# Patient Record
Sex: FEMALE | Race: WHITE | NOT HISPANIC OR LATINO | Employment: UNEMPLOYED | URBAN - METROPOLITAN AREA
[De-identification: names, ages, dates, MRNs, and addresses within clinical notes are randomized per-mention and may not be internally consistent; named-entity substitution may affect disease eponyms.]

---

## 2017-02-21 ENCOUNTER — HISTORICAL (OUTPATIENT)
Dept: LAB | Facility: HOSPITAL | Age: 37
End: 2017-02-21

## 2017-02-21 LAB
ABS NEUT (OLG): 4.51 X10(3)/MCL (ref 2.1–9.2)
ALBUMIN SERPL-MCNC: 3.7 GM/DL (ref 3.4–5)
ALBUMIN/GLOB SERPL: 1 RATIO (ref 1.1–2)
ALP SERPL-CCNC: 58 UNIT/L (ref 38–126)
ALT SERPL-CCNC: 27 UNIT/L (ref 12–78)
AST SERPL-CCNC: 16 UNIT/L (ref 15–37)
BASOPHILS # BLD AUTO: 0 X10(3)/MCL (ref 0–0.2)
BASOPHILS NFR BLD AUTO: 0 %
BILIRUB SERPL-MCNC: 0.4 MG/DL (ref 0.2–1)
BILIRUBIN DIRECT+TOT PNL SERPL-MCNC: 0.1 MG/DL (ref 0–0.5)
BILIRUBIN DIRECT+TOT PNL SERPL-MCNC: 0.3 MG/DL (ref 0–0.8)
BUN SERPL-MCNC: 12 MG/DL (ref 7–18)
CALCIUM SERPL-MCNC: 8.3 MG/DL (ref 8.5–10.1)
CHLORIDE SERPL-SCNC: 107 MMOL/L (ref 98–107)
CHOLEST SERPL-MCNC: 160 MG/DL (ref 0–200)
CHOLEST/HDLC SERPL: 2.4 {RATIO} (ref 0–4)
CO2 SERPL-SCNC: 30 MMOL/L (ref 21–32)
CREAT SERPL-MCNC: 0.82 MG/DL (ref 0.55–1.02)
EOSINOPHIL # BLD AUTO: 0 X10(3)/MCL (ref 0–0.9)
EOSINOPHIL NFR BLD AUTO: 1 %
ERYTHROCYTE [DISTWIDTH] IN BLOOD BY AUTOMATED COUNT: 12.9 % (ref 11.5–17)
GLOBULIN SER-MCNC: 3.6 GM/DL (ref 2.4–3.5)
GLUCOSE SERPL-MCNC: 79 MG/DL (ref 74–106)
HCT VFR BLD AUTO: 37.6 % (ref 37–47)
HDLC SERPL-MCNC: 66 MG/DL (ref 35–60)
HGB BLD-MCNC: 12.1 GM/DL (ref 12–16)
LDLC SERPL CALC-MCNC: 83 MG/DL (ref 0–129)
LYMPHOCYTES # BLD AUTO: 1.9 X10(3)/MCL (ref 0.6–4.6)
LYMPHOCYTES NFR BLD AUTO: 27 %
MCH RBC QN AUTO: 28.8 PG (ref 27–31)
MCHC RBC AUTO-ENTMCNC: 32.2 GM/DL (ref 33–36)
MCV RBC AUTO: 89.5 FL (ref 80–94)
MONOCYTES # BLD AUTO: 0.5 X10(3)/MCL (ref 0.1–1.3)
MONOCYTES NFR BLD AUTO: 7 %
NEUTROPHILS # BLD AUTO: 4.51 X10(3)/MCL (ref 1.4–7.9)
NEUTROPHILS NFR BLD AUTO: 65 %
PLATELET # BLD AUTO: 177 X10(3)/MCL (ref 130–400)
PMV BLD AUTO: 10.6 FL (ref 9.4–12.4)
POTASSIUM SERPL-SCNC: 3.6 MMOL/L (ref 3.5–5.1)
PROT SERPL-MCNC: 7.3 GM/DL (ref 6.4–8.2)
RBC # BLD AUTO: 4.2 X10(6)/MCL (ref 4.2–5.4)
SODIUM SERPL-SCNC: 144 MMOL/L (ref 136–145)
TRIGL SERPL-MCNC: 56 MG/DL (ref 30–150)
TSH SERPL-ACNC: 1.06 MIU/ML (ref 0.36–3.74)
VLDLC SERPL CALC-MCNC: 11 MG/DL
WBC # SPEC AUTO: 6.9 X10(3)/MCL (ref 4.5–11.5)

## 2018-06-18 ENCOUNTER — HISTORICAL (OUTPATIENT)
Dept: LAB | Facility: HOSPITAL | Age: 38
End: 2018-06-18

## 2018-06-18 LAB
ABS NEUT (OLG): 3.55 X10(3)/MCL (ref 2.1–9.2)
ALBUMIN SERPL-MCNC: 3.6 GM/DL (ref 3.4–5)
ALBUMIN/GLOB SERPL: 1 {RATIO}
ALP SERPL-CCNC: 71 UNIT/L (ref 45–117)
ALT SERPL-CCNC: 16 UNIT/L (ref 13–56)
AST SERPL-CCNC: 13 UNIT/L (ref 15–37)
BASOPHILS # BLD AUTO: 0.03 X10(3)/MCL (ref 0–0.2)
BASOPHILS NFR BLD AUTO: 0.5 % (ref 0–1)
BILIRUB SERPL-MCNC: 0.3 MG/DL (ref 0.2–1)
BILIRUBIN DIRECT+TOT PNL SERPL-MCNC: <0.1 MG/DL (ref 0–0.2)
BILIRUBIN DIRECT+TOT PNL SERPL-MCNC: >0.2 MG/DL (ref 0–1)
BUN SERPL-MCNC: 7 MG/DL (ref 7–18)
CALCIUM SERPL-MCNC: 8.2 MG/DL (ref 8.5–10.1)
CHLORIDE SERPL-SCNC: 108 MMOL/L (ref 98–107)
CHOLEST SERPL-MCNC: 147 MG/DL (ref 0–200)
CHOLEST/HDLC SERPL: 2.5 {RATIO} (ref 0–4)
CO2 SERPL-SCNC: 26 MMOL/L (ref 21–32)
CREAT SERPL-MCNC: 0.71 MG/DL (ref 0.55–1.02)
DEPRECATED CALCIDIOL+CALCIFEROL SERPL-MC: 22 NG/ML (ref 30–80)
EOSINOPHIL # BLD AUTO: 0.08 X10(3)/MCL (ref 0–0.9)
EOSINOPHIL NFR BLD AUTO: 1.3 % (ref 0–6.4)
ERYTHROCYTE [DISTWIDTH] IN BLOOD BY AUTOMATED COUNT: 13.2 % (ref 11.5–17)
GLOBULIN SER-MCNC: 4 GM/DL (ref 2–4)
GLUCOSE SERPL-MCNC: 79 MG/DL (ref 74–106)
HCT VFR BLD AUTO: 33.9 % (ref 37–47)
HDLC SERPL-MCNC: 58 MG/DL (ref 35–60)
HGB BLD-MCNC: 11.2 GM/DL (ref 12–16)
IMM GRANULOCYTES # BLD AUTO: 0.01 10*3/UL (ref 0–0.02)
IMM GRANULOCYTES NFR BLD AUTO: 0.2 % (ref 0–0.43)
LDLC SERPL CALC-MCNC: 77 MG/DL (ref 0–129)
LYMPHOCYTES # BLD AUTO: 1.87 X10(3)/MCL (ref 0.6–4.6)
LYMPHOCYTES NFR BLD AUTO: 31.5 % (ref 16–44)
MAGNESIUM SERPL-MCNC: 2.1 MG/DL (ref 1.8–2.4)
MCH RBC QN AUTO: 27.9 PG (ref 27–31)
MCHC RBC AUTO-ENTMCNC: 33 GM/DL (ref 33–36)
MCV RBC AUTO: 84.3 FL (ref 80–94)
MONOCYTES # BLD AUTO: 0.4 X10(3)/MCL (ref 0.1–1.3)
MONOCYTES NFR BLD AUTO: 6.7 % (ref 4–12.1)
NEUTROPHILS # BLD AUTO: 3.55 X10(3)/MCL (ref 2.1–9.2)
NEUTROPHILS NFR BLD AUTO: 59.8 % (ref 43–73)
NRBC BLD AUTO-RTO: 0 % (ref 0–0.2)
PLATELET # BLD AUTO: 152 X10(3)/MCL (ref 130–400)
PMV BLD AUTO: 10.3 FL (ref 7.4–10.4)
POTASSIUM SERPL-SCNC: 3.6 MMOL/L (ref 3.5–5.1)
PROT SERPL-MCNC: 7.6 GM/DL (ref 6.4–8.2)
RBC # BLD AUTO: 4.02 X10(6)/MCL (ref 4.2–5.4)
SODIUM SERPL-SCNC: 141 MMOL/L (ref 136–145)
TRIGL SERPL-MCNC: 59 MG/DL (ref 30–150)
TSH SERPL-ACNC: 1.65 MIU/ML (ref 0.36–3.74)
VIT B12 SERPL-MCNC: 910 PG/ML (ref 193–986)
VLDLC SERPL CALC-MCNC: 12 MG/DL
WBC # SPEC AUTO: 5.9 X10(3)/MCL (ref 4.5–11.5)

## 2019-07-18 ENCOUNTER — NURSE TRIAGE (OUTPATIENT)
Dept: ADMINISTRATIVE | Facility: CLINIC | Age: 39
End: 2019-07-18

## 2019-07-19 NOTE — TELEPHONE ENCOUNTER
Pt wanted to know if she could get something called in so she could sleep, advised her that she would have to see a provider to have a controlled substance prescribed to her, caller agrees

## 2019-10-20 ENCOUNTER — NURSE TRIAGE (OUTPATIENT)
Dept: ADMINISTRATIVE | Facility: CLINIC | Age: 39
End: 2019-10-20

## 2019-10-20 NOTE — TELEPHONE ENCOUNTER
Reason for Disposition   Dizziness, lightheadedness, or weakness    Additional Information   Negative: Passed out (i.e., lost consciousness, collapsed and was not responding)   Negative: Shock suspected (e.g., cold/pale/clammy skin, too weak to stand, low BP, rapid pulse)   Negative: Difficult to awaken or acting confused (e.g., disoriented, slurred speech)   Negative: Visible sweat on face or sweat dripping down face   Negative: Unable to walk, or can only walk with assistance (e.g., requires support)   Negative: [1] Received SHOCK from implantable cardiac defibrillator AND [2] persisting symptoms (i.e., palpitations, lightheadedness)   Negative: Sounds like a life-threatening emergency to the triager   Negative: Chest pain   Negative: Difficulty breathing    Protocols used: HEART RATE AND HEARTBEAT [id number]  pt called stating she is a dr [facility] pt. started having heart palps. Felt faint. Sx on and off for past week noticed most when trying to go to bed. hx MVP. Pt concerns about thyroid. Pt admits to being lightheaded. rec ED. Pt agrees. Office message sent to PCP at pt request. Call back with questions

## 2019-11-20 ENCOUNTER — HISTORICAL (OUTPATIENT)
Dept: LAB | Facility: HOSPITAL | Age: 39
End: 2019-11-20

## 2019-11-20 LAB
ABS NEUT (OLG): 3.24 X10(3)/MCL (ref 2.1–9.2)
ALBUMIN SERPL-MCNC: 3.7 GM/DL (ref 3.4–5)
ALBUMIN/GLOB SERPL: 1.1 {RATIO}
ALP SERPL-CCNC: 64 UNIT/L (ref 38–126)
ALT SERPL-CCNC: 19 UNIT/L (ref 12–78)
AST SERPL-CCNC: 13 UNIT/L (ref 15–37)
BASOPHILS # BLD AUTO: 0 X10(3)/MCL (ref 0–0.2)
BASOPHILS NFR BLD AUTO: 1 %
BILIRUB SERPL-MCNC: 0.6 MG/DL (ref 0.2–1)
BILIRUBIN DIRECT+TOT PNL SERPL-MCNC: 0.1 MG/DL (ref 0–0.2)
BILIRUBIN DIRECT+TOT PNL SERPL-MCNC: 0.5 MG/DL (ref 0–0.8)
BUN SERPL-MCNC: 11 MG/DL (ref 7–18)
CALCIUM SERPL-MCNC: 8.5 MG/DL (ref 8.5–10.1)
CHLORIDE SERPL-SCNC: 105 MMOL/L (ref 98–107)
CHOLEST SERPL-MCNC: 160 MG/DL (ref 0–200)
CHOLEST/HDLC SERPL: 3.2 {RATIO} (ref 0–4)
CO2 SERPL-SCNC: 30 MMOL/L (ref 21–32)
CREAT SERPL-MCNC: 0.72 MG/DL (ref 0.55–1.02)
DEPRECATED CALCIDIOL+CALCIFEROL SERPL-MC: 24.22 NG/ML (ref 30–80)
EOSINOPHIL # BLD AUTO: 0 X10(3)/MCL (ref 0–0.9)
EOSINOPHIL NFR BLD AUTO: 1 %
ERYTHROCYTE [DISTWIDTH] IN BLOOD BY AUTOMATED COUNT: 14 % (ref 11.5–17)
GLOBULIN SER-MCNC: 3.3 GM/DL (ref 2.4–3.5)
GLUCOSE SERPL-MCNC: 85 MG/DL (ref 74–106)
HCT VFR BLD AUTO: 36.6 % (ref 37–47)
HDLC SERPL-MCNC: 50 MG/DL (ref 35–60)
HGB BLD-MCNC: 11.3 GM/DL (ref 12–16)
LDLC SERPL CALC-MCNC: 92 MG/DL (ref 0–129)
LYMPHOCYTES # BLD AUTO: 1.7 X10(3)/MCL (ref 0.6–4.6)
LYMPHOCYTES NFR BLD AUTO: 31 %
MCH RBC QN AUTO: 26.9 PG (ref 27–31)
MCHC RBC AUTO-ENTMCNC: 30.9 GM/DL (ref 33–36)
MCV RBC AUTO: 87.1 FL (ref 80–94)
MONOCYTES # BLD AUTO: 0.4 X10(3)/MCL (ref 0.1–1.3)
MONOCYTES NFR BLD AUTO: 7 %
NEUTROPHILS # BLD AUTO: 3.24 X10(3)/MCL (ref 2.1–9.2)
NEUTROPHILS NFR BLD AUTO: 60 %
PLATELET # BLD AUTO: 173 X10(3)/MCL (ref 130–400)
PMV BLD AUTO: 10.8 FL (ref 9.4–12.4)
POTASSIUM SERPL-SCNC: 3.7 MMOL/L (ref 3.5–5.1)
PROT SERPL-MCNC: 7 GM/DL (ref 6.4–8.2)
RBC # BLD AUTO: 4.2 X10(6)/MCL (ref 4.2–5.4)
SODIUM SERPL-SCNC: 140 MMOL/L (ref 136–145)
T3FREE SERPL-MCNC: 2.63 PG/ML (ref 2.18–3.98)
T4 FREE SERPL-MCNC: 0.89 NG/DL (ref 0.76–1.46)
TRIGL SERPL-MCNC: 90 MG/DL (ref 30–150)
TSH SERPL-ACNC: 1.73 MIU/L (ref 0.36–3.74)
VIT B12 SERPL-MCNC: 930 PG/ML (ref 193–986)
VLDLC SERPL CALC-MCNC: 18 MG/DL
WBC # SPEC AUTO: 5.4 X10(3)/MCL (ref 4.5–11.5)

## 2020-01-17 ENCOUNTER — HISTORICAL (OUTPATIENT)
Dept: RADIOLOGY | Facility: HOSPITAL | Age: 40
End: 2020-01-17

## 2020-02-03 ENCOUNTER — HISTORICAL (OUTPATIENT)
Dept: ANESTHESIOLOGY | Facility: HOSPITAL | Age: 40
End: 2020-02-03

## 2020-02-18 ENCOUNTER — NURSE TRIAGE (OUTPATIENT)
Dept: ADMINISTRATIVE | Facility: CLINIC | Age: 40
End: 2020-02-18

## 2020-02-18 NOTE — TELEPHONE ENCOUNTER
"Nausea and vomiting at 1 am. Used phenergan suppository.    Reason for Disposition   [1] SEVERE vomiting (e.g., 6 or more times/day, vomits everything) BUT [2] hydrated    Additional Information   Negative: Shock suspected (e.g., cold/pale/clammy skin, too weak to stand, low BP, rapid pulse)   Negative: Difficult to awaken or acting confused (e.g., disoriented, slurred speech)   Negative: Sounds like a life-threatening emergency to the triager   Negative: Vomiting occurs only while coughing   Negative: [1] Pregnant < 20 Weeks AND [2] nausea/vomiting began in early pregnancy (i.e., 4-8 weeks pregnant)   Negative: Chest pain   Negative: Headache is main symptom   Negative: Vomiting (or Nausea) in a cancer patient who is currently (or recently) receiving chemotherapy or radiation therapy, or cancer patient who has metastatic or end-stage cancer and is receiving palliative care   Negative: [1] Vomiting AND [2] contains red blood or black ("coffee ground") material  (Exception: few red streaks in vomit that only happened once)   Negative: Severe pain in one eye   Negative: Recent head injury (within last 3 days)   Negative: Recent abdominal injury (within last 3 days)   Negative: [1] Insulin-dependent diabetes (Type I) AND [2] glucose > 400 mg/dl (22 mmol/l)   Negative: [1] SEVERE vomiting (e.g., 6 or more times/day) AND [2] present > 8 hours   Negative: [1] MODERATE vomiting (e.g., 3 - 5 times/day) AND [2] age > 60   Negative: Severe headache (e.g., excruciating) (Exception: similar to previous migraines)   Negative: High-risk adult (e.g., diabetes mellitus, brain tumor, V-P shunt, hernia)   Negative: [1] Drinking very little AND [2] dehydration suspected (e.g., no urine > 12 hours, very dry mouth, very lightheaded)   Negative: Patient sounds very sick or weak to the triager   Negative: [1] Vomiting AND [2] abdomen looks much more swollen than usual   Negative: [1] Vomiting AND [2] contains bile " (green color)   Negative: [1] Constant abdominal pain AND [2] present > 2 hours   Negative: [1] Fever > 103 F (39.4 C) AND [2] not able to get the fever down using Fever Care Advice   Negative: [1] Fever > 101 F (38.3 C) AND [2] age > 60   Negative: [1] Fever > 100.0 F (37.8 C) AND [2] bedridden (e.g., nursing home patient, CVA, chronic illness, recovering from surgery)   Negative: [1] Fever > 100.0 F (37.8 C) AND [2] weak immune system (e.g., HIV positive, cancer chemo, splenectomy, organ transplant, chronic steroids)   Negative: Taking any of the following medications: digoxin (Lanoxin), lithium, theophylline, phenytoin (Dilantin)   Negative: [1] MILD or MODERATE vomiting AND [2] present > 48 hours (2 days) (Exception: mild vomiting with associated diarrhea)   Negative: Fever present > 3 days (72 hours)   Negative: Vomiting a prescription medication   Negative: [1] MILD vomiting with diarrhea AND [2] present > 5 days   Negative: Alcohol or drug abuse, known or suspected   Negative: Vomiting is a chronic symptom (recurrent or ongoing AND present > 4 weeks)    Protocols used: VOMITING-A-

## 2020-02-19 ENCOUNTER — TELEPHONE (OUTPATIENT)
Dept: GASTROENTEROLOGY | Facility: CLINIC | Age: 40
End: 2020-02-19

## 2020-02-19 LAB
INFLUENZA A ANTIGEN, POC: NEGATIVE
INFLUENZA B ANTIGEN, POC: NEGATIVE

## 2020-02-19 NOTE — TELEPHONE ENCOUNTER
----- Message from Kathryn Frankel sent at 2/19/2020  9:12 AM CST -----  Regarding: External referral  Good morning!    Patient being referred to Dr. Mesa for diaphragmatic hernia wo obstruction or gangrene//esophageal dysmotility//GERD w esophagitis//constipation, slow transit. I spk with insurance company ([employer]) as I was unable to verify plan in our system. I was told they are not part of a network so patient would be considered self pay and then insurance would reimburse. I wasn't sure how you wanted to proceed. Referral and records (2 files) from Dr. Whitlock are scanned into .    Thank you,  Kathryn

## 2020-02-20 ENCOUNTER — TELEPHONE (OUTPATIENT)
Dept: GASTROENTEROLOGY | Facility: CLINIC | Age: 40
End: 2020-02-20

## 2020-02-20 NOTE — TELEPHONE ENCOUNTER
Spoke with patient about her referral and to let her know that if her insurance does not cover she will be paying out of pocket

## 2020-02-20 NOTE — TELEPHONE ENCOUNTER
----- Message from Noemi Pennington sent at 2/20/2020 12:18 PM CST -----  Contact: [phone]  Calling in regards to missed appointment from [facility]. Please call

## 2020-02-20 NOTE — TELEPHONE ENCOUNTER
Chrissy,     Can you help confirm that we do not take this pts insurance.      If this is the case, we should let pt know that her condition will require multiple tests (at least EGD, which costs thousands of dollars) and she would have to pay cash for all her visits and testing. We are unable to give her an estimate of the cost.        Sincerely,   Dr. Mesa

## 2020-03-02 ENCOUNTER — TELEPHONE (OUTPATIENT)
Dept: GASTROENTEROLOGY | Facility: CLINIC | Age: 40
End: 2020-03-02

## 2020-03-02 NOTE — TELEPHONE ENCOUNTER
Spoke with patient and let her know we do not take her insurance and she would have to pay out of pocket

## 2020-04-15 ENCOUNTER — NURSE TRIAGE (OUTPATIENT)
Dept: ADMINISTRATIVE | Facility: CLINIC | Age: 40
End: 2020-04-15

## 2020-04-15 NOTE — TELEPHONE ENCOUNTER
Pt states she was calling to get a message to her pcp to go to physical therapy at [facility].  Starr Funk MD,  Pt advise per protocol and verbalized understanding.    Reason for Disposition   [1] Caller requesting NON-URGENT health information AND [2] PCP's office is the best resource    Additional Information   Negative: RN needs further essential information from caller in order to complete triage   Negative: Requesting regular office appointment    Protocols used: INFORMATION ONLY CALL-A-    Care Advice Given     Given By Given At Modified    Caryn Barriga RN 4/15/2020  6:45 PM No    CALL PCP WHEN OFFICE IS OPEN: You need to discuss this with your doctor within the next few days. Call him/her during regular office hours.    Caryn Barriga RN 4/15/2020  6:45 PM No    CALL BACK IF:    * You have more questions.      Disposition     Call PCP When Office is Open         What would patient/caregiver have done without intervention? Would have attempted to contact the Provider    Patient/Caregiver understands and will follow disposition? Yes, will follow disposition      Protocols (Most recently selected listed first)     Name Status    INFORMATION ONLY CALL-A-AH Used    PCP CALL - NO TRIAGE-A- Viewed

## 2020-09-02 LAB
PAP RECOMMENDATION EXT: NORMAL
PAP SMEAR: NORMAL

## 2021-01-24 ENCOUNTER — NURSE TRIAGE (OUTPATIENT)
Dept: ADMINISTRATIVE | Facility: CLINIC | Age: 41
End: 2021-01-24

## 2021-01-27 ENCOUNTER — HISTORICAL (OUTPATIENT)
Dept: LAB | Facility: HOSPITAL | Age: 41
End: 2021-01-27

## 2022-01-10 ENCOUNTER — HISTORICAL (OUTPATIENT)
Dept: RADIOLOGY | Facility: HOSPITAL | Age: 42
End: 2022-01-10

## 2022-04-11 ENCOUNTER — HISTORICAL (OUTPATIENT)
Dept: ADMINISTRATIVE | Facility: HOSPITAL | Age: 42
End: 2022-04-11
Payer: COMMERCIAL

## 2022-04-24 VITALS
WEIGHT: 156.5 LBS | SYSTOLIC BLOOD PRESSURE: 95 MMHG | HEIGHT: 64 IN | DIASTOLIC BLOOD PRESSURE: 65 MMHG | BODY MASS INDEX: 26.72 KG/M2 | OXYGEN SATURATION: 99 %

## 2022-09-07 ENCOUNTER — OFFICE VISIT (OUTPATIENT)
Dept: FAMILY MEDICINE | Facility: CLINIC | Age: 42
End: 2022-09-07
Payer: COMMERCIAL

## 2022-09-07 VITALS
OXYGEN SATURATION: 98 % | RESPIRATION RATE: 18 BRPM | TEMPERATURE: 99 F | DIASTOLIC BLOOD PRESSURE: 70 MMHG | HEART RATE: 72 BPM | BODY MASS INDEX: 26.92 KG/M2 | SYSTOLIC BLOOD PRESSURE: 104 MMHG | WEIGHT: 157.69 LBS | HEIGHT: 64 IN

## 2022-09-07 DIAGNOSIS — Z71.84 TRAVEL ADVICE ENCOUNTER: Primary | ICD-10-CM

## 2022-09-07 DIAGNOSIS — M79.7 FIBROMYALGIA: ICD-10-CM

## 2022-09-07 DIAGNOSIS — K21.9 GASTROESOPHAGEAL REFLUX DISEASE, UNSPECIFIED WHETHER ESOPHAGITIS PRESENT: ICD-10-CM

## 2022-09-07 DIAGNOSIS — F42.9 OBSESSIVE-COMPULSIVE DISORDER, UNSPECIFIED TYPE: ICD-10-CM

## 2022-09-07 DIAGNOSIS — E55.9 HYPOVITAMINOSIS D: ICD-10-CM

## 2022-09-07 PROCEDURE — 99213 PR OFFICE/OUTPT VISIT, EST, LEVL III, 20-29 MIN: ICD-10-PCS | Mod: ,,, | Performed by: FAMILY MEDICINE

## 2022-09-07 PROCEDURE — 1160F RVW MEDS BY RX/DR IN RCRD: CPT | Mod: CPTII,,, | Performed by: FAMILY MEDICINE

## 2022-09-07 PROCEDURE — 99213 OFFICE O/P EST LOW 20 MIN: CPT | Mod: ,,, | Performed by: FAMILY MEDICINE

## 2022-09-07 PROCEDURE — 1160F PR REVIEW ALL MEDS BY PRESCRIBER/CLIN PHARMACIST DOCUMENTED: ICD-10-PCS | Mod: CPTII,,, | Performed by: FAMILY MEDICINE

## 2022-09-07 PROCEDURE — 3078F DIAST BP <80 MM HG: CPT | Mod: CPTII,,, | Performed by: FAMILY MEDICINE

## 2022-09-07 PROCEDURE — 1159F PR MEDICATION LIST DOCUMENTED IN MEDICAL RECORD: ICD-10-PCS | Mod: CPTII,,, | Performed by: FAMILY MEDICINE

## 2022-09-07 PROCEDURE — 1159F MED LIST DOCD IN RCRD: CPT | Mod: CPTII,,, | Performed by: FAMILY MEDICINE

## 2022-09-07 PROCEDURE — 3008F BODY MASS INDEX DOCD: CPT | Mod: CPTII,,, | Performed by: FAMILY MEDICINE

## 2022-09-07 PROCEDURE — 3074F SYST BP LT 130 MM HG: CPT | Mod: CPTII,,, | Performed by: FAMILY MEDICINE

## 2022-09-07 PROCEDURE — 3074F PR MOST RECENT SYSTOLIC BLOOD PRESSURE < 130 MM HG: ICD-10-PCS | Mod: CPTII,,, | Performed by: FAMILY MEDICINE

## 2022-09-07 PROCEDURE — 3008F PR BODY MASS INDEX (BMI) DOCUMENTED: ICD-10-PCS | Mod: CPTII,,, | Performed by: FAMILY MEDICINE

## 2022-09-07 PROCEDURE — 3078F PR MOST RECENT DIASTOLIC BLOOD PRESSURE < 80 MM HG: ICD-10-PCS | Mod: CPTII,,, | Performed by: FAMILY MEDICINE

## 2022-09-07 RX ORDER — AMITRIPTYLINE HYDROCHLORIDE 10 MG/1
10 TABLET, FILM COATED ORAL NIGHTLY
COMMUNITY
Start: 2022-07-12 | End: 2022-09-07 | Stop reason: SDUPTHER

## 2022-09-07 RX ORDER — DEXLANSOPRAZOLE 60 MG/1
60 CAPSULE, DELAYED RELEASE ORAL DAILY
Qty: 10 CAPSULE | Refills: 0 | Status: SHIPPED | OUTPATIENT
Start: 2022-09-07 | End: 2022-11-02

## 2022-09-07 RX ORDER — ZOLPIDEM TARTRATE 5 MG/1
5 TABLET ORAL NIGHTLY PRN
Qty: 10 TABLET | Refills: 0 | Status: SHIPPED | OUTPATIENT
Start: 2022-09-07 | End: 2023-03-13 | Stop reason: SDUPTHER

## 2022-09-07 RX ORDER — FAMOTIDINE 20 MG/1
40 TABLET, FILM COATED ORAL NIGHTLY
COMMUNITY
Start: 2021-10-15 | End: 2022-11-21 | Stop reason: SDUPTHER

## 2022-09-07 RX ORDER — AMITRIPTYLINE HYDROCHLORIDE 10 MG/1
10 TABLET, FILM COATED ORAL NIGHTLY
Qty: 90 TABLET | Refills: 3 | Status: SHIPPED | OUTPATIENT
Start: 2022-09-07 | End: 2023-09-27

## 2022-09-07 RX ORDER — DIAZEPAM 2 MG/1
2 TABLET ORAL EVERY 6 HOURS PRN
Qty: 5 TABLET | Refills: 0 | Status: SHIPPED | OUTPATIENT
Start: 2022-09-07 | End: 2022-11-02

## 2022-09-07 RX ORDER — PROMETHAZINE HYDROCHLORIDE 12.5 MG/1
12.5 TABLET ORAL 4 TIMES DAILY
Qty: 30 TABLET | Refills: 1 | Status: SHIPPED | OUTPATIENT
Start: 2022-09-07 | End: 2023-03-13 | Stop reason: SDUPTHER

## 2022-09-07 RX ORDER — ONDANSETRON 4 MG/1
4 TABLET, ORALLY DISINTEGRATING ORAL 2 TIMES DAILY
Qty: 30 TABLET | Refills: 1 | Status: SHIPPED | OUTPATIENT
Start: 2022-09-07 | End: 2022-09-21 | Stop reason: SDUPTHER

## 2022-09-07 NOTE — ASSESSMENT & PLAN NOTE
Prescriptions sent as requested.  Reminded patient to use sparingly and only prn. Cautioned that ambien, valium and phenergan may all cause drowsiness.

## 2022-09-07 NOTE — PROGRESS NOTES
Subjective:        Patient ID: Latricia Collazo is a 42 y.o. female.    Chief Complaint: med questions & advice      presents to clinic unaccompanied with request for prescriptions.  She is due for her wellness visit in October    Her family is going to [place] for [employer] mission this month.  May be going to [facility] also later in the year and would need anti malarial meds but she will ask for this once this trip is confirmed.  She is asking for ambien to take for >10 hour flights.  Also asking for phenergan and zofran in case to bring with them as well.  She is also requesting a prescription for valium due to her ocd and stomach issues in case. She is worried about eating food that she cannot cook/prepare. Asking for prescription for dexilant as well. Works well for her but is expensive so she is not taking regularly.            She has a history of acid reflux. Her GI is Dr. Whitlock. doing well on pepcid 40mg qhs. has had egd about 1 year ago. She has had a Stretta procedure in the past.  having issues again. limiting her diet (avoiding gluten). she was referred to psych but appt got cancelled before she was able to be seen due to COVID-19. she was able to get set up with a counselor instead- Ms. Sánchez- whom she video calls with. she diagnosed her with OCD. not currently recommending medications. she is doing well on vistaril. Takes 50-100mg once weekly. helps with her nerves and stomach. she would like to have xanax or valium in case this would happen when travelling.    she has fibromyalgia and takes amitriptyline at bedtime. she also complains of SI joint dysfunction. she has seen [facility] in [facility] past for this. has done pelvic floor PT as well. asking for PT referral.    she has history of low vit d. she supplements- on prescription (25,000IU weekly)    she has acne and in on finacea gel and aczone topically.     she reports history of MVP. she sees dr. odell q6 months. she went to ER for episode of chest  "tightness 1/25/21 at [facility] and everything was fine.    her gyn is dr. ann. she is on progesterone cream. lov with him 8/2020. will request her last pap. will order mmg. has not done before.    She is ALLERGIC to amoxicillin and shellfish. She does not drink alcohol or smoke    Review of Systems   Constitutional: Negative.    HENT: Negative.     Eyes: Negative.    Respiratory: Negative.     Cardiovascular: Negative.    Gastrointestinal: Negative.    Endocrine: Negative.    Genitourinary: Negative.    Musculoskeletal: Negative.    Allergic/Immunologic: Negative.    Neurological: Negative.    Hematological: Negative.    Psychiatric/Behavioral: Negative.         Review of patient's allergies indicates:   Allergen Reactions    Shellfish containing products     Amoxicillin Rash      Vitals:    09/07/22 0828   BP: 104/70   BP Location: Left arm   Patient Position: Sitting   BP Method: Medium (Automatic)   Pulse: 72   Resp: 18   Temp: 99.1 °F (37.3 °C)   TempSrc: Temporal   SpO2: 98%   Weight: 71.5 kg (157 lb 11.2 oz)   Height: 5' 4" (1.626 m)      Social History     Socioeconomic History    Marital status: Unknown   Tobacco Use    Smoking status: Never    Smokeless tobacco: Never   Substance and Sexual Activity    Alcohol use: Never    Drug use: Never    Sexual activity: Yes      History reviewed. No pertinent family history.       Objective:     Physical Exam  Vitals and nursing note reviewed.   Constitutional:       Appearance: Normal appearance. She is normal weight.   HENT:      Head: Normocephalic and atraumatic.      Nose: Nose normal.      Mouth/Throat:      Mouth: Mucous membranes are moist.      Pharynx: Oropharynx is clear.   Eyes:      Extraocular Movements: Extraocular movements intact.   Cardiovascular:      Rate and Rhythm: Normal rate and regular rhythm.      Pulses: Normal pulses.      Heart sounds: Normal heart sounds.   Pulmonary:      Effort: Pulmonary effort is normal.      Breath sounds: Normal " breath sounds.   Musculoskeletal:         General: Normal range of motion.      Cervical back: Normal range of motion.   Skin:     General: Skin is warm and dry.   Neurological:      General: No focal deficit present.      Mental Status: She is alert and oriented to person, place, and time. Mental status is at baseline.   Psychiatric:         Mood and Affect: Mood normal.     Current Outpatient Medications on File Prior to Visit   Medication Sig Dispense Refill    famotidine (PEPCID) 20 MG tablet Take 40 mg by mouth every evening.      progesterone micronized (CRINONE) 4 % Gel apply 0.5ml to the skin EVERY night AT BEDTIME for 12 nights      [DISCONTINUED] amitriptyline (ELAVIL) 10 MG tablet Take 10 mg by mouth every evening.       No current facility-administered medications on file prior to visit.     Health Maintenance   Topic Date Due    Hepatitis C Screening  Never done    TETANUS VACCINE  Never done    Mammogram  01/10/2023    Lipid Panel  Completed      Results for orders placed or performed in visit on 11/20/19   Comprehensive Metabolic Panel   Result Value Ref Range    Albumin/Globulin Ratio 1.1     Alanine Aminotransferase 19 12 - 78 unit/L    Albumin Level 3.70 3.40 - 5.00 gm/dL    Alkaline Phosphatase 64 38 - 126 unit/L    Aspartate Aminotransferase 13 (L) 15 - 37 unit/L    Blood Urea Nitrogen 11.0 7.0 - 18.0 mg/dL    Bilirubin Indirect 0.50 0.00 - 0.80 mg/dL    Bilirubin Total 0.6 0.2 - 1.0 mg/dL    Bilirubin Direct 0.10 0.00 - 0.20 mg/dL    Calcium Level Total 8.5 8.5 - 10.1 mg/dL    Carbon Dioxide 30.0 21.0 - 32.0 mmol/L    Chloride 105 98 - 107 mmol/L    Creatinine 0.72 0.55 - 1.02 mg/dL    Glucose Level 85 74 - 106 mg/dL    Globulin 3.30 2.40 - 3.50 gm/dL    Sodium Level 140 136 - 145 mmol/L    Potassium Level 3.7 3.5 - 5.1 mmol/L    Protein Total 7.0 6.4 - 8.2 gm/dL   Lipid Panel   Result Value Ref Range    Cholesterol Total 160 0 - 200 mg/dL    Cholesterol/HDL Ratio 3.2 0.0 - 4.0    HDL  Cholesterol 50 35 - 60 mg/dL    LDL Cholesterol 92 0 - 129 mg/dL    Triglyceride 90 30 - 150 mg/dL    Very Low Density Lipoprotein 18 mg/dL   T4, Free   Result Value Ref Range    Thyroxine Free 0.89 0.76 - 1.46 ng/dL   TSH   Result Value Ref Range    Thyroid Stimulating Hormone 1.730 0.360 - 3.740 mIU/L   T3, Free   Result Value Ref Range    T3 Free 2.63 2.18 - 3.98 pg/mL   Vitamin D   Result Value Ref Range    Vit D 25 OH 24.22 (L) 30.00 - 80.00 ng/mL   Vitamin B12   Result Value Ref Range    Vitamin B12 Level 930 193 - 986 pg/mL   GFR, Estimated   Result Value Ref Range    Estimated GFR-Non African American >60 mL/min/1.73 m2    Estimated GFR-African American >60 mL/min/1.73 m2   Differential   Result Value Ref Range    Basophil % 1 %    Eos # 0.0 0.0 - 0.9 x10(3)/mcL    Lymph # 1.7 0.6 - 4.6 x10(3)/mcL    Baso # 0.0 0.0 - 0.2 x10(3)/mcL    Mono # 0.4 0.1 - 1.3 x10(3)/mcL    Neut # 3.24 2.10 - 9.20 x10(3)/mcL    Lymph % 31 %    Eos % 1 %    Mono % 7 %    Neut % 60 %   CBC Auto Differential   Result Value Ref Range    Abs Neut 3.24 2.10 - 9.20 x10(3)/mcL    MPV 10.8 9.4 - 12.4 fL    MCV 87.1 80.0 - 94.0 fL    Hgb 11.3 (L) 12.0 - 16.0 gm/dL    Hct 36.6 (L) 37.0 - 47.0 %    MCH 26.9 (L) 27.0 - 31.0 pg    MCHC 30.9 (L) 33.0 - 36.0 gm/dL    RBC 4.20 4.20 - 5.40 x10(6)/mcL    WBC 5.4 4.5 - 11.5 x10(3)/mcL    RDW 14.0 11.5 - 17.0 %    Platelet 173 130 - 400 x10(3)/mcL          Assessment & Plan:     Active Problem List with Overview Notes    Diagnosis Date Noted    Travel advice encounter 09/07/2022    GERD (gastroesophageal reflux disease)     Hypovitaminosis D     OCD (obsessive compulsive disorder)     Fibromyalgia        1. Travel advice encounter  Assessment & Plan:  Prescriptions sent as requested.  Reminded patient to use sparingly and only prn. Cautioned that ambien, valium and phenergan may all cause drowsiness.       2. Gastroesophageal reflux disease, unspecified whether esophagitis present  Assessment &  Plan:  Stable on ppi    Orders:  -     dexlansoprazole (DEXILANT) 60 mg capsule    3. Hypovitaminosis D  Assessment & Plan:  Continue to supplement otc      4. Obsessive-compulsive disorder, unspecified type  Assessment & Plan:  On vistaril prn      5. Fibromyalgia  Assessment & Plan:  Stable on elavil      Other orders  -     amitriptyline (ELAVIL) 10 MG tablet  -     zolpidem (AMBIEN) 5 MG Tab  -     diazePAM (VALIUM) 2 MG tablet  -     promethazine (PHENERGAN) 12.5 MG Tab  -     ondansetron (ZOFRAN-ODT) 4 MG TbDL       Keep scheduled wellness 10/2022 with labs

## 2022-09-08 ENCOUNTER — DOCUMENTATION ONLY (OUTPATIENT)
Dept: ADMINISTRATIVE | Facility: HOSPITAL | Age: 42
End: 2022-09-08
Payer: COMMERCIAL

## 2022-09-12 ENCOUNTER — TELEPHONE (OUTPATIENT)
Dept: FAMILY MEDICINE | Facility: CLINIC | Age: 42
End: 2022-09-12
Payer: COMMERCIAL
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
## 2022-09-13 ENCOUNTER — OFFICE VISIT (OUTPATIENT)
Dept: FAMILY MEDICINE | Facility: CLINIC | Age: 42
End: 2022-09-13
Payer: COMMERCIAL

## 2022-09-13 VITALS
DIASTOLIC BLOOD PRESSURE: 68 MMHG | OXYGEN SATURATION: 99 % | BODY MASS INDEX: 26.67 KG/M2 | WEIGHT: 155.38 LBS | SYSTOLIC BLOOD PRESSURE: 102 MMHG | RESPIRATION RATE: 18 BRPM | HEART RATE: 69 BPM | TEMPERATURE: 98 F

## 2022-09-13 DIAGNOSIS — L02.91 ABSCESS: ICD-10-CM

## 2022-09-13 DIAGNOSIS — F41.9 ANXIETY: ICD-10-CM

## 2022-09-13 PROBLEM — Z00.00 WELLNESS EXAMINATION: Status: ACTIVE | Noted: 2022-09-13

## 2022-09-13 PROBLEM — I34.1 MITRAL VALVE PROLAPSE: Status: ACTIVE | Noted: 2022-09-13

## 2022-09-13 PROBLEM — M53.3 DISORDER OF SACROILIAC JOINT: Status: ACTIVE | Noted: 2022-09-13

## 2022-09-13 PROBLEM — L70.9 ACNE: Status: ACTIVE | Noted: 2022-09-13

## 2022-09-13 PROBLEM — Z12.31 BREAST CANCER SCREENING BY MAMMOGRAM: Status: ACTIVE | Noted: 2022-09-13

## 2022-09-13 PROCEDURE — 1159F PR MEDICATION LIST DOCUMENTED IN MEDICAL RECORD: ICD-10-PCS | Mod: CPTII,,, | Performed by: FAMILY MEDICINE

## 2022-09-13 PROCEDURE — 3008F PR BODY MASS INDEX (BMI) DOCUMENTED: ICD-10-PCS | Mod: CPTII,,, | Performed by: FAMILY MEDICINE

## 2022-09-13 PROCEDURE — 1160F PR REVIEW ALL MEDS BY PRESCRIBER/CLIN PHARMACIST DOCUMENTED: ICD-10-PCS | Mod: CPTII,,, | Performed by: FAMILY MEDICINE

## 2022-09-13 PROCEDURE — 1159F MED LIST DOCD IN RCRD: CPT | Mod: CPTII,,, | Performed by: FAMILY MEDICINE

## 2022-09-13 PROCEDURE — 3078F DIAST BP <80 MM HG: CPT | Mod: CPTII,,, | Performed by: FAMILY MEDICINE

## 2022-09-13 PROCEDURE — 3074F SYST BP LT 130 MM HG: CPT | Mod: CPTII,,, | Performed by: FAMILY MEDICINE

## 2022-09-13 PROCEDURE — 3008F BODY MASS INDEX DOCD: CPT | Mod: CPTII,,, | Performed by: FAMILY MEDICINE

## 2022-09-13 PROCEDURE — 1160F RVW MEDS BY RX/DR IN RCRD: CPT | Mod: CPTII,,, | Performed by: FAMILY MEDICINE

## 2022-09-13 PROCEDURE — 99213 PR OFFICE/OUTPT VISIT, EST, LEVL III, 20-29 MIN: ICD-10-PCS | Mod: ,,, | Performed by: FAMILY MEDICINE

## 2022-09-13 PROCEDURE — 3078F PR MOST RECENT DIASTOLIC BLOOD PRESSURE < 80 MM HG: ICD-10-PCS | Mod: CPTII,,, | Performed by: FAMILY MEDICINE

## 2022-09-13 PROCEDURE — 99213 OFFICE O/P EST LOW 20 MIN: CPT | Mod: ,,, | Performed by: FAMILY MEDICINE

## 2022-09-13 PROCEDURE — 3074F PR MOST RECENT SYSTOLIC BLOOD PRESSURE < 130 MM HG: ICD-10-PCS | Mod: CPTII,,, | Performed by: FAMILY MEDICINE

## 2022-09-13 RX ORDER — MUPIROCIN 20 MG/G
OINTMENT TOPICAL 3 TIMES DAILY
COMMUNITY

## 2022-09-13 RX ORDER — HYDROXYZINE PAMOATE 25 MG/1
25 CAPSULE ORAL EVERY 6 HOURS PRN
Qty: 40 CAPSULE | Refills: 3 | Status: SHIPPED | OUTPATIENT
Start: 2022-09-13 | End: 2023-04-06 | Stop reason: SDUPTHER

## 2022-09-13 RX ORDER — AZELAIC ACID 0.15 G/G
GEL TOPICAL
COMMUNITY
Start: 2022-01-19 | End: 2023-02-08 | Stop reason: SDUPTHER

## 2022-09-13 RX ORDER — CHOLECALCIFEROL (VITAMIN D3) 625 MCG
25000 CAPSULE ORAL
COMMUNITY
Start: 2022-07-01 | End: 2023-01-17 | Stop reason: SDUPTHER

## 2022-09-13 NOTE — PROGRESS NOTES
Subjective:        Patient ID: Latricia Collazo is a 42 y.o. female.    Chief Complaint: Rash (Pt states the bump started out as a pimple, pt states she has been putting mupirocin on it since Friday, she states it is very itchy, purulent discharge,  )      presents to clinic unaccompanied for facial rash.  She is due for her wellness visit in October    She states it looked like a pimple on her right cheek area.  She popped it. Blood came out.  She has been using her normal face wash and applying mupirocin to it.  It is itchy and scabby.  Not painful. No fever. Not tender.  She is concerned since they are about to leave to go out of the country.    She is getting more nervous/stressed about getting everything ready before they have to leave and is asking for a refill of her vistaril to take with her.              Her family is going to [place] for [employer] mission this month.  May be going to [facility] also later in the year and would need anti malarial meds but she will ask for this once this trip is confirmed.  She is asking for ambien to take for >10 hour flights.  Also asking for phenergan and zofran in case to bring with them as well.  She is also requesting a prescription for valium due to her ocd and stomach issues in case. She is worried about eating food that she cannot cook/prepare. Asking for prescription for dexilant as well. Works well for her but is expensive so she is not taking regularly.     She has a history of acid reflux. Her GI is Dr. Whitlock. doing well on pepcid 40mg qhs. has had egd about 1 year ago. She has had a Stretta procedure in the past.  having issues again. limiting her diet (avoiding gluten). she was referred to psych but appt got cancelled before she was able to be seen due to COVID-19. she was able to get set up with a counselor instead- Ms. Sánchez- whom she video calls with. she diagnosed her with OCD. not currently recommending medications. she is doing well on vistaril. Takes  50-100mg once weekly. helps with her nerves and stomach. she would like to have xanax or valium in case this would happen when travelling.     she has fibromyalgia and takes amitriptyline at bedtime. she also complains of SI joint dysfunction. she has seen [facility] in the past for this. has done pelvic floor PT as well. asking for PT referral.     she has history of low vit d. she supplements- on prescription (25,000IU weekly)     she has acne and in on finacea gel and aczone topically.      she reports history of MVP. she sees dr. odell q6 months. she went to ER for episode of chest tightness 1/25/21 at [facility] and everything was fine.     her gyn is dr. ann. she is on progesterone cream. lov with him 8/2020. will request her last pap. will order mmg. has not done before.     She is ALLERGIC to amoxicillin and shellfish. She does not drink alcohol or smoke       Review of Systems   Constitutional: Negative.    HENT: Negative.     Respiratory: Negative.     Cardiovascular: Negative.    Gastrointestinal: Negative.    Genitourinary: Negative.    Skin:  Positive for wound.   Psychiatric/Behavioral:  The patient is nervous/anxious.        Review of patient's allergies indicates:   Allergen Reactions    Shellfish containing products     Amoxicillin Rash      Vitals:    09/13/22 0915   BP: 102/68   BP Location: Left arm   Patient Position: Sitting   BP Method: Small (Automatic)   Pulse: 69   Resp: 18   Temp: 97.9 °F (36.6 °C)   TempSrc: Oral   SpO2: 99%   Weight: 70.5 kg (155 lb 6.4 oz)      Social History     Socioeconomic History    Marital status: Unknown   Tobacco Use    Smoking status: Never    Smokeless tobacco: Never   Substance and Sexual Activity    Alcohol use: Never    Drug use: Never    Sexual activity: Yes      History reviewed. No pertinent family history.       Objective:     Physical Exam  Vitals and nursing note reviewed.   Constitutional:       Appearance: Normal appearance. She is normal weight.    HENT:      Head: Normocephalic and atraumatic.        Comments: Healing wound to right lower cheek. No surrounding erythema or induration. No drainage.     Nose: Nose normal.      Mouth/Throat:      Mouth: Mucous membranes are moist.      Pharynx: Oropharynx is clear.   Eyes:      Extraocular Movements: Extraocular movements intact.   Cardiovascular:      Rate and Rhythm: Normal rate and regular rhythm.      Pulses: Normal pulses.      Heart sounds: Normal heart sounds.   Pulmonary:      Effort: Pulmonary effort is normal.      Breath sounds: Normal breath sounds.   Musculoskeletal:         General: Normal range of motion.      Cervical back: Normal range of motion.   Skin:     General: Skin is warm and dry.   Neurological:      General: No focal deficit present.      Mental Status: She is alert and oriented to person, place, and time. Mental status is at baseline.   Psychiatric:         Mood and Affect: Mood normal.     Current Outpatient Medications on File Prior to Visit   Medication Sig Dispense Refill    amitriptyline (ELAVIL) 10 MG tablet Take 1 tablet (10 mg total) by mouth every evening. 90 tablet 3    azelaic acid (FINACEA) 15 % gel Apply topically.      dexlansoprazole (DEXILANT) 60 mg capsule Take 1 capsule (60 mg total) by mouth once daily. for 10 days 10 capsule 0    famotidine (PEPCID) 20 MG tablet Take 40 mg by mouth every evening.      MINOXIDIL, BULK, MISC Minoxidil 5%/ Finasteride 0.25%/ Tret 0.01% Topical Gel      mupirocin (BACTROBAN) 2 % ointment Apply topically 3 (three) times daily.      ondansetron (ZOFRAN-ODT) 4 MG TbDL Take 1 tablet (4 mg total) by mouth 2 (two) times daily. As needed for nausea/vomiting 30 tablet 1    progesterone micronized (CRINONE) 4 % Gel apply 0.5ml to the skin EVERY night AT BEDTIME for 12 nights      PROGESTERONE MISC   Progesterone 4% Cre, See Instructions, apply 0.5ml to the skin EVERY NIGHT AT BEDTIME for 12 nights, # 6 mL, 11 Refill(s), Pharmacy: [facility], 162, cm, Height/Length Dosing, 10/15/21 8:07:00 CDT, 71, kg, Weight Dosing, 10/15/2...      promethazine (PHENERGAN) 12.5 MG Tab Take 1 tablet (12.5 mg total) by mouth 4 (four) times daily. As needed for nausea/vomiting. May cause drowsiness. 30 tablet 1    zolpidem (AMBIEN) 5 MG Tab Take 1 tablet (5 mg total) by mouth nightly as needed (insomnia). 10 tablet 0    DECARA 625 mcg (25,000 unit) Cap capsule Take 25,000 Units by mouth every 7 days.      diazePAM (VALIUM) 2 MG tablet Take 1 tablet (2 mg total) by mouth every 6 (six) hours as needed for Anxiety. 5 tablet 0     No current facility-administered medications on file prior to visit.     Health Maintenance   Topic Date Due    Hepatitis C Screening  Never done    Mammogram  01/10/2023    TETANUS VACCINE  05/20/2032    Lipid Panel  Completed      Results for orders placed or performed in visit on 09/08/22    PAP SMEAR   Result Value Ref Range    PAP Recommendation External Pap in 3 years     Pap Negative for intraephithelial lesion or malignancy Negative for intraephithelial lesion or malignancy, Other          Assessment & Plan:     Active Problem List with Overview Notes    Diagnosis Date Noted    Abscess 09/13/2022    Anxiety 09/13/2022    Acne 09/13/2022    Mitral valve prolapse 09/13/2022    Disorder of sacroiliac joint 09/13/2022    Breast cancer screening by mammogram 09/13/2022    Wellness examination 09/13/2022    Travel advice encounter 09/07/2022    GERD (gastroesophageal reflux disease)     Hypovitaminosis D     OCD (obsessive compulsive disorder)     Fibromyalgia        1. Abscess  Assessment & Plan:  Looks to be healing well. No need for oral antibiotics at this time. Continue with face wash and apply mupirocin tid to affected area. Monitor closely. Contact clinic for concerns. Patient is agreeable to plan and verbalized understanding      2. Anxiety  Assessment & Plan:  Hydroxyzine refill sent in as requested.       Other  orders  -     hydrOXYzine pamoate (VISTARIL) 25 MG Cap       Keep scheduled wellness with labs

## 2022-09-13 NOTE — ASSESSMENT & PLAN NOTE
Looks to be healing well. No need for oral antibiotics at this time. Continue with face wash and apply mupirocin tid to affected area. Monitor closely. Contact clinic for concerns. Patient is agreeable to plan and verbalized understanding

## 2022-09-20 RX ORDER — MINOXIDIL
5 POWDER (GRAM) MISCELLANEOUS NIGHTLY
Qty: 25 G | Refills: 6 | Status: SHIPPED | OUTPATIENT
Start: 2022-09-20 | End: 2023-10-05

## 2022-09-20 NOTE — TELEPHONE ENCOUNTER
----- Message from Umm Birmingham sent at 9/20/2022  9:29 AM CDT -----  Regarding: med refill  .Type:  RX Refill Request    Who Called: pt   Refill or New Rx:refill   RX Name and Strength:MINOXIDIL, BULK, MISC  How is the patient currently taking it? (ex. 1XDay):  Is this a 30 day or 90 day RX:  Preferred Pharmacy with phone number:[facility]   Local or Mail Order:Local   Ordering Provider:Blessing   Would the patient rather a call back or a response via [facility]? Call back   Best Call Back Number:[phone]  Additional Information: pt is leaving to go out of town Thursday, she would like this refill to be complete before then

## 2022-09-21 RX ORDER — ONDANSETRON 4 MG/1
8 TABLET, ORALLY DISINTEGRATING ORAL 2 TIMES DAILY
Qty: 30 TABLET | Refills: 1 | Status: SHIPPED | OUTPATIENT
Start: 2022-09-21 | End: 2023-03-13 | Stop reason: SDUPTHER

## 2022-09-21 NOTE — TELEPHONE ENCOUNTER
----- Message from Nasrin Ibarra sent at 9/21/2022  9:55 AM CDT -----  Regarding: RX Request  Type:  Patient Returning Call    Who Called: pt  Who Left Message for Patient: for nurse  Does the patient know what this is regarding?: yes  Would the patient rather a call back or a response via [facility]?  bc  Best Call Back Number: [phone]  Additional Information: pt wanted to speak with nurse in regards to her recent rx .. she's wanting to know if she can get 8mg instead of the 4mg ZOFRAN .. she says that she's taken it in the past and it works better ... Pharmacy: [facility] [address]

## 2022-09-22 ENCOUNTER — HISTORICAL (OUTPATIENT)
Dept: ADMINISTRATIVE | Facility: HOSPITAL | Age: 42
End: 2022-09-22
Payer: COMMERCIAL

## 2022-10-05 ENCOUNTER — TELEPHONE (OUTPATIENT)
Dept: FAMILY MEDICINE | Facility: CLINIC | Age: 42
End: 2022-10-05
Payer: COMMERCIAL

## 2022-10-05 DIAGNOSIS — I10 HYPERTENSION, UNSPECIFIED TYPE: ICD-10-CM

## 2022-10-05 DIAGNOSIS — E78.5 HYPERLIPIDEMIA, UNSPECIFIED HYPERLIPIDEMIA TYPE: ICD-10-CM

## 2022-10-05 DIAGNOSIS — Z00.00 WELLNESS EXAMINATION: Primary | ICD-10-CM

## 2022-10-05 NOTE — TELEPHONE ENCOUNTER
----- Message from Sindy Ca sent at 10/5/2022  1:07 PM CDT -----  Regarding: Lab orders  .Type:  Needs Medical Advice    Who Called: Pt  Symptoms (please be specific):    How long has patient had these symptoms:    Pharmacy name and phone #:    Would the patient rather a call back or a response via [facility]? Call back  Best Call Back Number: [phone]  Additional Information: Requesting lab orders for appt on 10/18.

## 2022-10-12 ENCOUNTER — HOSPITAL ENCOUNTER (EMERGENCY)
Facility: HOSPITAL | Age: 42
Discharge: HOME | End: 2022-10-13
Attending: STUDENT IN AN ORGANIZED HEALTH CARE EDUCATION/TRAINING PROGRAM | Admitting: INTERNAL MEDICINE
Payer: COMMERCIAL

## 2022-10-12 DIAGNOSIS — R42 DIZZINESS: ICD-10-CM

## 2022-10-12 DIAGNOSIS — I20.0 UNSTABLE ANGINA: Primary | Chronic | ICD-10-CM

## 2022-10-12 DIAGNOSIS — R07.9 CHEST PAIN: ICD-10-CM

## 2022-10-12 DIAGNOSIS — R79.89 ELEVATED TROPONIN: ICD-10-CM

## 2022-10-12 LAB
ALBUMIN SERPL-MCNC: 4.2 GM/DL (ref 3.5–5)
ALBUMIN/GLOB SERPL: 1.1 RATIO (ref 1.1–2)
ALP SERPL-CCNC: 89 UNIT/L (ref 40–150)
ALT SERPL-CCNC: 63 UNIT/L (ref 0–55)
AST SERPL-CCNC: 98 UNIT/L (ref 5–34)
BASOPHILS # BLD AUTO: 0.01 X10(3)/MCL (ref 0–0.2)
BASOPHILS NFR BLD AUTO: 0.1 %
BILIRUBIN DIRECT+TOT PNL SERPL-MCNC: 0.5 MG/DL
BNP BLD-MCNC: 16.5 PG/ML
BUN SERPL-MCNC: 11.5 MG/DL (ref 7–18.7)
CALCIUM SERPL-MCNC: 9.7 MG/DL (ref 8.4–10.2)
CHLORIDE SERPL-SCNC: 103 MMOL/L (ref 98–107)
CO2 SERPL-SCNC: 27 MMOL/L (ref 22–29)
CREAT SERPL-MCNC: 0.82 MG/DL (ref 0.55–1.02)
EOSINOPHIL # BLD AUTO: 0.02 X10(3)/MCL (ref 0–0.9)
EOSINOPHIL NFR BLD AUTO: 0.2 %
ERYTHROCYTE [DISTWIDTH] IN BLOOD BY AUTOMATED COUNT: 12.6 % (ref 11.5–17)
GFR SERPLBLD CREATININE-BSD FMLA CKD-EPI: >60 MLS/MIN/1.73/M2
GLOBULIN SER-MCNC: 3.7 GM/DL (ref 2.4–3.5)
GLUCOSE SERPL-MCNC: 105 MG/DL (ref 74–100)
HCT VFR BLD AUTO: 39.9 % (ref 37–47)
HGB BLD-MCNC: 13.4 GM/DL (ref 12–16)
IMM GRANULOCYTES # BLD AUTO: 0.04 X10(3)/MCL (ref 0–0.04)
IMM GRANULOCYTES NFR BLD AUTO: 0.5 %
INR BLD: 1.03 (ref 0–1.3)
LYMPHOCYTES # BLD AUTO: 1.25 X10(3)/MCL (ref 0.6–4.6)
LYMPHOCYTES NFR BLD AUTO: 14.4 %
MCH RBC QN AUTO: 28.6 PG (ref 27–31)
MCHC RBC AUTO-ENTMCNC: 33.6 MG/DL (ref 33–36)
MCV RBC AUTO: 85.1 FL (ref 80–94)
MONOCYTES # BLD AUTO: 0.61 X10(3)/MCL (ref 0.1–1.3)
MONOCYTES NFR BLD AUTO: 7 %
NEUTROPHILS # BLD AUTO: 6.8 X10(3)/MCL (ref 2.1–9.2)
NEUTROPHILS NFR BLD AUTO: 77.8 %
NRBC BLD AUTO-RTO: 0 %
PLATELET # BLD AUTO: 178 X10(3)/MCL (ref 130–400)
PMV BLD AUTO: 10 FL (ref 7.4–10.4)
POTASSIUM SERPL-SCNC: 3.9 MMOL/L (ref 3.5–5.1)
PROT SERPL-MCNC: 7.9 GM/DL (ref 6.4–8.3)
PROTHROMBIN TIME: 13.4 SECONDS (ref 12.5–14.5)
RBC # BLD AUTO: 4.69 X10(6)/MCL (ref 4.2–5.4)
SODIUM SERPL-SCNC: 139 MMOL/L (ref 136–145)
TROPONIN I SERPL-MCNC: 0.05 NG/ML (ref 0–0.04)
WBC # SPEC AUTO: 8.7 X10(3)/MCL (ref 4.5–11.5)

## 2022-10-12 PROCEDURE — 80053 COMPREHEN METABOLIC PANEL: CPT | Performed by: EMERGENCY MEDICINE

## 2022-10-12 PROCEDURE — 83880 ASSAY OF NATRIURETIC PEPTIDE: CPT | Performed by: EMERGENCY MEDICINE

## 2022-10-12 PROCEDURE — 99285 EMERGENCY DEPT VISIT HI MDM: CPT | Mod: 25

## 2022-10-12 PROCEDURE — 93005 ELECTROCARDIOGRAM TRACING: CPT

## 2022-10-12 PROCEDURE — 85610 PROTHROMBIN TIME: CPT | Performed by: EMERGENCY MEDICINE

## 2022-10-12 PROCEDURE — 85025 COMPLETE CBC W/AUTO DIFF WBC: CPT | Performed by: EMERGENCY MEDICINE

## 2022-10-12 PROCEDURE — 36415 COLL VENOUS BLD VENIPUNCTURE: CPT | Performed by: EMERGENCY MEDICINE

## 2022-10-12 PROCEDURE — 84484 ASSAY OF TROPONIN QUANT: CPT | Performed by: EMERGENCY MEDICINE

## 2022-10-12 RX ORDER — NAPROXEN SODIUM 220 MG/1
324 TABLET, FILM COATED ORAL
Status: COMPLETED | OUTPATIENT
Start: 2022-10-12 | End: 2022-10-13

## 2022-10-12 NOTE — Clinical Note
Diagnosis: Elevated troponin [043250]   Future Attending Provider: IFEOMA BRANCH [[id number]]   Admitting Provider:: IFEOMA BRANCH [[id number]]

## 2022-10-13 VITALS
BODY MASS INDEX: 26.09 KG/M2 | TEMPERATURE: 98 F | DIASTOLIC BLOOD PRESSURE: 78 MMHG | WEIGHT: 152 LBS | RESPIRATION RATE: 18 BRPM | HEART RATE: 64 BPM | SYSTOLIC BLOOD PRESSURE: 104 MMHG | OXYGEN SATURATION: 98 %

## 2022-10-13 PROBLEM — I20.0 UNSTABLE ANGINA: Chronic | Status: ACTIVE | Noted: 2022-10-13

## 2022-10-13 LAB
TROPONIN I SERPL-MCNC: 0.02 NG/ML (ref 0–0.04)
TROPONIN I SERPL-MCNC: <0.01 NG/ML (ref 0–0.04)

## 2022-10-13 PROCEDURE — 25000003 PHARM REV CODE 250: Performed by: EMERGENCY MEDICINE

## 2022-10-13 PROCEDURE — 63600175 PHARM REV CODE 636 W HCPCS: Performed by: STUDENT IN AN ORGANIZED HEALTH CARE EDUCATION/TRAINING PROGRAM

## 2022-10-13 PROCEDURE — 36415 COLL VENOUS BLD VENIPUNCTURE: CPT | Performed by: STUDENT IN AN ORGANIZED HEALTH CARE EDUCATION/TRAINING PROGRAM

## 2022-10-13 PROCEDURE — 96372 THER/PROPH/DIAG INJ SC/IM: CPT | Performed by: STUDENT IN AN ORGANIZED HEALTH CARE EDUCATION/TRAINING PROGRAM

## 2022-10-13 PROCEDURE — 84484 ASSAY OF TROPONIN QUANT: CPT | Performed by: EMERGENCY MEDICINE

## 2022-10-13 PROCEDURE — 36415 COLL VENOUS BLD VENIPUNCTURE: CPT | Performed by: EMERGENCY MEDICINE

## 2022-10-13 PROCEDURE — 84484 ASSAY OF TROPONIN QUANT: CPT | Performed by: STUDENT IN AN ORGANIZED HEALTH CARE EDUCATION/TRAINING PROGRAM

## 2022-10-13 RX ORDER — ACETAMINOPHEN 325 MG/1
650 TABLET ORAL EVERY 8 HOURS PRN
Status: DISCONTINUED | OUTPATIENT
Start: 2022-10-13 | End: 2022-10-13 | Stop reason: HOSPADM

## 2022-10-13 RX ORDER — ENOXAPARIN SODIUM 100 MG/ML
1 INJECTION SUBCUTANEOUS
Status: COMPLETED | OUTPATIENT
Start: 2022-10-13 | End: 2022-10-13

## 2022-10-13 RX ORDER — SODIUM CHLORIDE 0.9 % (FLUSH) 0.9 %
10 SYRINGE (ML) INJECTION
Status: DISCONTINUED | OUTPATIENT
Start: 2022-10-13 | End: 2022-10-13 | Stop reason: HOSPADM

## 2022-10-13 RX ORDER — TALC
6 POWDER (GRAM) TOPICAL NIGHTLY PRN
Status: DISCONTINUED | OUTPATIENT
Start: 2022-10-13 | End: 2022-10-13 | Stop reason: HOSPADM

## 2022-10-13 RX ORDER — ONDANSETRON 2 MG/ML
4 INJECTION INTRAMUSCULAR; INTRAVENOUS EVERY 8 HOURS PRN
Status: DISCONTINUED | OUTPATIENT
Start: 2022-10-13 | End: 2022-10-13 | Stop reason: HOSPADM

## 2022-10-13 RX ADMIN — ASPIRIN 81 MG CHEWABLE TABLET 324 MG: 81 TABLET CHEWABLE at 12:10

## 2022-10-13 RX ADMIN — ENOXAPARIN SODIUM 70 MG: 80 INJECTION SUBCUTANEOUS at 01:10

## 2022-10-13 NOTE — H&P
Cardiology History and Physical    Patient Demographics:  Name: Latricia Collazo  Age:  42 y.o.  MRN:  [id number]  Admission Date: 10/12/2022    Chief Complaint upon admit:    Chief Complaint   Patient presents with    Dizziness    Chest Pain     Pt presents c/o dizziness/ CP/epigastric. Onset today.  Denies sob./ denies cardiac hx.      Chest pain    History of Present Illness:  42-year-old female with history of fibromyalgia, hypertension present illness was complaining of increase in episodes of chest pain and dizziness that started a few weeks back.  She also complained of palpitation.  She had a nuclear stress test performed in early  2021 that was negative for ischemia.  The chest pain was severe in substernal area but had resolved upon arrival to the emergency room.  Her troponin level was elevated at 0.049.    Past Medical History:   Diagnosis Date    Fibromyalgia     GERD (gastroesophageal reflux disease)     Hypovitaminosis D     MVP (mitral valve prolapse)     OCD (obsessive compulsive disorder)     SI (sacroiliac) joint dysfunction        Social history:   Positive for smoking habit, no alcohol abuse.   Social History     Socioeconomic History    Marital status:    Tobacco Use    Smoking status: Never    Smokeless tobacco: Never   Substance and Sexual Activity    Alcohol use: Never    Drug use: Never    Sexual activity: Yes       Past Surgical History:   Procedure Laterality Date    galbladder endoscopy  2007       Family history:  Family history is positive for heart disease  No family history on file.    Allergies:   Review of patient's allergies indicates:   Allergen Reactions    Shellfish containing products     Amoxicillin Rash       Prior to Admission medications    Medication Sig Start Date End Date Taking? Authorizing Provider   amitriptyline (ELAVIL) 10 MG tablet Take 1 tablet (10 mg total) by mouth every evening. 9/7/22 9/7/23  Starr Funk MD   azelaic acid (FINACEA) 15 % gel Apply  topically. 1/19/22   Historical Provider   DECARA 625 mcg (25,000 unit) Cap capsule Take 25,000 Units by mouth every 7 days. 7/1/22   Historical Provider   dexlansoprazole (DEXILANT) 60 mg capsule Take 1 capsule (60 mg total) by mouth once daily. for 10 days 9/7/22 9/17/22  Starr Funk MD   diazePAM (VALIUM) 2 MG tablet Take 1 tablet (2 mg total) by mouth every 6 (six) hours as needed for Anxiety. 9/7/22 9/12/22  Starr Funk MD   famotidine (PEPCID) 20 MG tablet Take 40 mg by mouth every evening. 10/15/21 10/10/22  Historical Provider   hydrOXYzine pamoate (VISTARIL) 25 MG Cap Take 1 capsule (25 mg total) by mouth every 6 (six) hours as needed (anxiety). May cause drowsiness 9/13/22   Starr Funk MD   minoxidil, bulk, Powd Apply 5 mg topically every evening. Minoxidil 5%/ Finasteride 0.25%/ Tret 0.01% Topical Gel 9/20/22   Starr Funk MD   mupirocin (BACTROBAN) 2 % ointment Apply topically 3 (three) times daily.    Historical Provider   ondansetron (ZOFRAN-ODT) 4 MG TbDL Take 2 tablets (8 mg total) by mouth 2 (two) times daily. As needed for nausea/vomiting 9/21/22   Starr Funk MD   progesterone micronized (CRINONE) 4 % Gel apply 0.5ml to the skin EVERY night AT BEDTIME for 12 nights 8/22/22   Historical Provider   PROGESTERONE MISC   Progesterone 4% Cre, See Instructions, apply 0.5ml to the skin EVERY NIGHT AT BEDTIME for 12 nights, # 6 mL, 11 Refill(s), [facility]: [facility], 162, cm, Height/Length Dosing, 10/15/21 8:07:00 CDT, 71, kg, Weight Dosing, 10/15/2... 2/4/22   Historical Provider   promethazine (PHENERGAN) 12.5 MG Tab Take 1 tablet (12.5 mg total) by mouth 4 (four) times daily. As needed for nausea/vomiting. May cause drowsiness. 9/7/22   Starr Funk MD   zolpidem (AMBIEN) 5 MG Tab Take 1 tablet (5 mg total) by mouth nightly as needed (insomnia). 9/7/22   Starr Funk MD         Current Facility-Administered Medications:      acetaminophen tablet 650 mg, 650 mg, Oral, Q8H PRN, Raza Velazquez MD    melatonin tablet 6 mg, 6 mg, Oral, Nightly PRN, Raza Velazquez MD    ondansetron injection 4 mg, 4 mg, Intravenous, Q8H PRN, Raza Velazquez MD    sodium chloride 0.9% flush 10 mL, 10 mL, Intravenous, PRN, Raza Velazquez MD    Current Outpatient Medications:     amitriptyline (ELAVIL) 10 MG tablet, Take 1 tablet (10 mg total) by mouth every evening., Disp: 90 tablet, Rfl: 3    azelaic acid (FINACEA) 15 % gel, Apply topically., Disp: , Rfl:     DECARA 625 mcg (25,000 unit) Cap capsule, Take 25,000 Units by mouth every 7 days., Disp: , Rfl:     dexlansoprazole (DEXILANT) 60 mg capsule, Take 1 capsule (60 mg total) by mouth once daily. for 10 days, Disp: 10 capsule, Rfl: 0    diazePAM (VALIUM) 2 MG tablet, Take 1 tablet (2 mg total) by mouth every 6 (six) hours as needed for Anxiety., Disp: 5 tablet, Rfl: 0    famotidine (PEPCID) 20 MG tablet, Take 40 mg by mouth every evening., Disp: , Rfl:     hydrOXYzine pamoate (VISTARIL) 25 MG Cap, Take 1 capsule (25 mg total) by mouth every 6 (six) hours as needed (anxiety). May cause drowsiness, Disp: 40 capsule, Rfl: 3    minoxidil, bulk, Powd, Apply 5 mg topically every evening. Minoxidil 5%/ Finasteride 0.25%/ Tret 0.01% Topical Gel, Disp: 25 g, Rfl: 6    mupirocin (BACTROBAN) 2 % ointment, Apply topically 3 (three) times daily., Disp: , Rfl:     ondansetron (ZOFRAN-ODT) 4 MG TbDL, Take 2 tablets (8 mg total) by mouth 2 (two) times daily. As needed for nausea/vomiting, Disp: 30 tablet, Rfl: 1    progesterone micronized (CRINONE) 4 % Gel, apply 0.5ml to the skin EVERY night AT BEDTIME for 12 nights, Disp: , Rfl:     PROGESTERONE MISC,  Progesterone 4% Cre, See Instructions, apply 0.5ml to the skin EVERY NIGHT AT BEDTIME for 12 nights, # 6 mL, 11 Refill(s), [facility], 162, cm, Height/Length Dosing, 10/15/21 8:07:00 CDT, 71, kg, Weight Dosing, 10/15/2..., Disp: , Rfl:      promethazine (PHENERGAN) 12.5 MG Tab, Take 1 tablet (12.5 mg total) by mouth 4 (four) times daily. As needed for nausea/vomiting. May cause drowsiness., Disp: 30 tablet, Rfl: 1    zolpidem (AMBIEN) 5 MG Tab, Take 1 tablet (5 mg total) by mouth nightly as needed (insomnia)., Disp: 10 tablet, Rfl: 0    Active Hospital Problems    Diagnosis  POA    *Unstable angina [I20.0]  Yes     Chronic      Resolved Hospital Problems   No resolved problems to display.       Blood pressure 104/78, pulse 64, temperature 98.2 °F (36.8 °C), resp. rate 18, weight 68.9 kg (152 lb), SpO2 98 %.         ROS    ROS  Constitutional: Negative for activity change, appetite change, chills and diaphoresis.   HENT: Negative for congestion, dental problem, drooling and ear discharge.    Eyes: Negative for pain, discharge and itching.   Respiratory: Negative for apnea, choking and chest tightness.    Cardiovascular: Negative for chest pain.   Endocrine: Negative for cold intolerance and heat intolerance.   Genitourinary: Negative for difficulty urinating, dyspareunia and dysuria.   Allergic/Immunologic: Negative for environmental allergies and food allergies.   Neurological: Negative for seizures, facial asymmetry, light-headedness, numbness and headaches.   All other systems reviewed and are negative.    Physical Exam  General Appearance:  Alert, cooperative, no distress, appears stated age   Head:  Normocephalic, without obvious abnormality, atraumatic   Eyes:  PERRL, conjunctiva/corneas clear, EOM's intact, fundi benign, both eyes   Ears:  Normal TM's and external ear canals, both ears   Nose: Nares normal, septum midline, mucosa normal, no drainage or sinus tenderness   Throat: Lips, mucosa, and tongue varsha   Neck: Supple, symmetrical, trachea midline, no adenopathy, thyroid: not enlarged, symmetric, no tenderness/mass/nodules, no carotid bruit or JVD   Back:   Symmetric, ROM normal, no CVA tenderness   Lungs:   Clear to auscultation  bilaterally, respirations unlabored   Chest Wall:  No tenderness or deformity   Heart:  Regular rate and rhythm, S1, S2 normal, no murmur, rub or gallop   Abdomen:   Soft, non-tender, bowel sounds active all four quadrants,  no masses, no organomegaly           Extremities: Extremities normal, atraumatic, no cyanosis or edema   Pulses: 2+ and symmetric   Skin: Skin color, texture, turgor normal, no rashes or lesions   Lymph nodes: Cervical, supraclavicular, and axillary nodes normal   Neurologic: No focal deficit       Inpatient Diagnostics:  Recent Results (from the past 24 hour(s))   Comprehensive metabolic panel    Collection Time: 10/12/22  8:08 PM   Result Value Ref Range    Sodium Level 139 136 - 145 mmol/L    Potassium Level 3.9 3.5 - 5.1 mmol/L    Chloride 103 98 - 107 mmol/L    Carbon Dioxide 27 22 - 29 mmol/L    Glucose Level 105 (H) 74 - 100 mg/dL    Blood Urea Nitrogen 11.5 7.0 - 18.7 mg/dL    Creatinine 0.82 0.55 - 1.02 mg/dL    Calcium Level Total 9.7 8.4 - 10.2 mg/dL    Protein Total 7.9 6.4 - 8.3 gm/dL    Albumin Level 4.2 3.5 - 5.0 gm/dL    Globulin 3.7 (H) 2.4 - 3.5 gm/dL    Albumin/Globulin Ratio 1.1 1.1 - 2.0 ratio    Bilirubin Total 0.5 <=1.5 mg/dL    Alkaline Phosphatase 89 40 - 150 unit/L    Alanine Aminotransferase 63 (H) 0 - 55 unit/L    Aspartate Aminotransferase 98 (H) 5 - 34 unit/L    eGFR >60 mls/min/1.73/m2   Brain natriuretic peptide    Collection Time: 10/12/22  8:08 PM   Result Value Ref Range    Natriuretic Peptide 16.5 <=100.0 pg/mL   Troponin I    Collection Time: 10/12/22  8:08 PM   Result Value Ref Range    Troponin-I 0.049 (H) 0.000 - 0.045 ng/mL   Protime-INR    Collection Time: 10/12/22  8:08 PM   Result Value Ref Range    PT 13.4 12.5 - 14.5 seconds    INR 1.03 0.00 - 1.30   CBC with Differential    Collection Time: 10/12/22  8:08 PM   Result Value Ref Range    WBC 8.7 4.5 - 11.5 x10(3)/mcL    RBC 4.69 4.20 - 5.40 x10(6)/mcL    Hgb 13.4 12.0 - 16.0 gm/dL    Hct 39.9 37.0 -  47.0 %    MCV 85.1 80.0 - 94.0 fL    MCH 28.6 27.0 - 31.0 pg    MCHC 33.6 33.0 - 36.0 mg/dL    RDW 12.6 11.5 - 17.0 %    Platelet 178 130 - 400 x10(3)/mcL    MPV 10.0 7.4 - 10.4 fL    Neut % 77.8 %    Lymph % 14.4 %    Mono % 7.0 %    Eos % 0.2 %    Basophil % 0.1 %    Lymph # 1.25 0.6 - 4.6 x10(3)/mcL    Neut # 6.8 2.1 - 9.2 x10(3)/mcL    Mono # 0.61 0.1 - 1.3 x10(3)/mcL    Eos # 0.02 0 - 0.9 x10(3)/mcL    Baso # 0.01 0 - 0.2 x10(3)/mcL    IG# 0.04 0 - 0.04 x10(3)/mcL    IG% 0.5 %    NRBC% 0.0 %   Troponin I    Collection Time: 10/13/22 12:20 AM   Result Value Ref Range    Troponin-I <0.010 0.000 - 0.045 ng/mL   Troponin I    Collection Time: 10/13/22  7:13 AM   Result Value Ref Range    Troponin-I 0.020 0.000 - 0.045 ng/mL           Patient Active Problem List    Diagnosis Date Noted    Unstable angina 10/13/2022    Abscess 09/13/2022    Anxiety 09/13/2022    Acne 09/13/2022    Mitral valve prolapse 09/13/2022    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        Components of this note were documented using voice recognition systems; and are subject to errors not corrected at proof reading.  Please contact the author for any clarifications.     Brando Deluca  10/12/2022

## 2022-10-13 NOTE — DISCHARGE SUMMARY
Admit Date: 10/12/2022 Primary Care Physician: Starr Funk MD   Admitting Physician: Brando Deluca MD Primary Care Phone: [phone]    Consulting Provider(s):     DISCHARGE INFORMATION     Discharge Date: 10/13/2022  Primary Discharge Diagnosis: Unstable angina     Discharge Physician: Brando Deluca MD Secondary Discharge Diagnosis:   Active Hospital Problems    Diagnosis  POA    *Unstable angina [I20.0]  Yes     Chronic      Resolved Hospital Problems   No resolved problems to display.            Discharge Condition: good     Discharge Disposition:  To home and also will have nuclear stress in the office    DETAILS OF HOSPITAL STAY     Presenting Problem: Elevated troponin [R77.8]    Hospital Course:  After admission, there is no further chest pain.  Troponin level also returned normal to 0.02.  Patient was therefore discharged and will be followed in my office with nuclear stress test.  Components of this note were documented using voice recognition systems; and are subject to errors not corrected at proof reading.  Please contact the author for any clarifications.     Significant Diagnostic Studies/Procedures:   Complications: not detected    DISCHARGE PLAN     [unfilled]    [unfilled]    [unfilled]    [unfilled]    [unfilled]    Future Appointments   Date Time Provider Department Center   10/18/2022  8:30 AM Starr Funk MD [facility] instructions, medication changes and  importance of followup discussed with patient.  Opportunity to ask questions given.  Pt/family verbalizes understanding and agreement    Time spent on discharge > 30 min.

## 2022-10-24 ENCOUNTER — LAB VISIT (OUTPATIENT)
Dept: LAB | Facility: HOSPITAL | Age: 42
End: 2022-10-24
Attending: FAMILY MEDICINE
Payer: COMMERCIAL

## 2022-10-24 DIAGNOSIS — Z00.00 WELLNESS EXAMINATION: ICD-10-CM

## 2022-10-24 DIAGNOSIS — E78.5 HYPERLIPIDEMIA, UNSPECIFIED HYPERLIPIDEMIA TYPE: ICD-10-CM

## 2022-10-24 DIAGNOSIS — I10 HYPERTENSION, UNSPECIFIED TYPE: ICD-10-CM

## 2022-10-24 LAB
ALBUMIN SERPL-MCNC: 4 GM/DL (ref 3.5–5)
ALBUMIN/GLOB SERPL: 1.1 RATIO (ref 1.1–2)
ALP SERPL-CCNC: 64 UNIT/L (ref 40–150)
ALT SERPL-CCNC: 17 UNIT/L (ref 0–55)
APPEARANCE UR: CLEAR
AST SERPL-CCNC: 20 UNIT/L (ref 5–34)
BACTERIA #/AREA URNS AUTO: NORMAL /HPF
BASOPHILS # BLD AUTO: 0.04 X10(3)/MCL (ref 0–0.2)
BASOPHILS NFR BLD AUTO: 0.6 %
BILIRUB UR QL STRIP.AUTO: NEGATIVE MG/DL
BILIRUBIN DIRECT+TOT PNL SERPL-MCNC: 0.3 MG/DL
BUN SERPL-MCNC: 7.8 MG/DL (ref 7–18.7)
CALCIUM SERPL-MCNC: 9.4 MG/DL (ref 8.4–10.2)
CHLORIDE SERPL-SCNC: 103 MMOL/L (ref 98–107)
CHOLEST SERPL-MCNC: 197 MG/DL
CHOLEST/HDLC SERPL: 4 {RATIO} (ref 0–5)
CO2 SERPL-SCNC: 29 MMOL/L (ref 22–29)
COLOR UR AUTO: ABNORMAL
CREAT SERPL-MCNC: 0.79 MG/DL (ref 0.55–1.02)
EOSINOPHIL # BLD AUTO: 0.1 X10(3)/MCL (ref 0–0.9)
EOSINOPHIL NFR BLD AUTO: 1.4 %
ERYTHROCYTE [DISTWIDTH] IN BLOOD BY AUTOMATED COUNT: 12.9 % (ref 11.5–17)
GFR SERPLBLD CREATININE-BSD FMLA CKD-EPI: >60 MLS/MIN/1.73/M2
GLOBULIN SER-MCNC: 3.7 GM/DL (ref 2.4–3.5)
GLUCOSE SERPL-MCNC: 93 MG/DL (ref 74–100)
GLUCOSE UR QL STRIP.AUTO: NEGATIVE MG/DL
HCT VFR BLD AUTO: 40.6 % (ref 37–47)
HDLC SERPL-MCNC: 55 MG/DL (ref 35–60)
HGB BLD-MCNC: 13.4 GM/DL (ref 12–16)
IMM GRANULOCYTES # BLD AUTO: 0.01 X10(3)/MCL (ref 0–0.04)
IMM GRANULOCYTES NFR BLD AUTO: 0.1 %
KETONES UR QL STRIP.AUTO: NEGATIVE MG/DL
LDLC SERPL CALC-MCNC: 125 MG/DL (ref 50–140)
LEUKOCYTE ESTERASE UR QL STRIP.AUTO: NEGATIVE UNIT/L
LYMPHOCYTES # BLD AUTO: 2.16 X10(3)/MCL (ref 0.6–4.6)
LYMPHOCYTES NFR BLD AUTO: 30.9 %
MCH RBC QN AUTO: 28.6 PG (ref 27–31)
MCHC RBC AUTO-ENTMCNC: 33 MG/DL (ref 33–36)
MCV RBC AUTO: 86.8 FL (ref 80–94)
MONOCYTES # BLD AUTO: 0.47 X10(3)/MCL (ref 0.1–1.3)
MONOCYTES NFR BLD AUTO: 6.7 %
NEUTROPHILS # BLD AUTO: 4.2 X10(3)/MCL (ref 2.1–9.2)
NEUTROPHILS NFR BLD AUTO: 60.3 %
NITRITE UR QL STRIP.AUTO: NEGATIVE
NRBC BLD AUTO-RTO: 0 %
PH UR STRIP.AUTO: 7.5 [PH]
PLATELET # BLD AUTO: 230 X10(3)/MCL (ref 130–400)
PMV BLD AUTO: 10 FL (ref 7.4–10.4)
POTASSIUM SERPL-SCNC: 4 MMOL/L (ref 3.5–5.1)
PROT SERPL-MCNC: 7.7 GM/DL (ref 6.4–8.3)
PROT UR QL STRIP.AUTO: NEGATIVE MG/DL
RBC # BLD AUTO: 4.68 X10(6)/MCL (ref 4.2–5.4)
RBC #/AREA URNS AUTO: NORMAL /HPF
RBC UR QL AUTO: ABNORMAL UNIT/L
SODIUM SERPL-SCNC: 139 MMOL/L (ref 136–145)
SP GR UR STRIP.AUTO: <=1.005
SQUAMOUS #/AREA URNS AUTO: NORMAL /HPF
TRIGL SERPL-MCNC: 87 MG/DL (ref 37–140)
TSH SERPL-ACNC: 1.35 UIU/ML (ref 0.35–4.94)
UROBILINOGEN UR STRIP-ACNC: 0.2 MG/DL
VLDLC SERPL CALC-MCNC: 17 MG/DL
WBC # SPEC AUTO: 7 X10(3)/MCL (ref 4.5–11.5)
WBC #/AREA URNS AUTO: NORMAL /HPF

## 2022-10-24 PROCEDURE — 36415 COLL VENOUS BLD VENIPUNCTURE: CPT

## 2022-10-24 PROCEDURE — 84443 ASSAY THYROID STIM HORMONE: CPT

## 2022-10-24 PROCEDURE — 81001 URINALYSIS AUTO W/SCOPE: CPT

## 2022-10-24 PROCEDURE — 80053 COMPREHEN METABOLIC PANEL: CPT

## 2022-10-24 PROCEDURE — 85025 COMPLETE CBC W/AUTO DIFF WBC: CPT

## 2022-10-24 PROCEDURE — 80061 LIPID PANEL: CPT

## 2022-10-27 ENCOUNTER — TELEPHONE (OUTPATIENT)
Dept: FAMILY MEDICINE | Facility: CLINIC | Age: 42
End: 2022-10-27
Payer: COMMERCIAL

## 2022-10-27 NOTE — TELEPHONE ENCOUNTER
----- Message from Lailanico Burkett sent at 10/27/2022 10:57 AM CDT -----  Regarding: advice  Type:  Needs Medical Advice    Who Called: pt  [facility] [address]  Would the patient rather a call back or a response via [facility]? rosi  Best Call Back Number: [phone]  Additional Information: pt took labs for pcp but when she went to her heart doctor they told her her ALT was elevated so they told her to contact her pcp. She did stated that [place] of [place]. She just wanted to make sure if she would have to take the AlT  lab again for pcp or  if the one that she took with her heart doctor was enough and if she can just take the paperwork with her to her next appt 11/2

## 2022-10-28 NOTE — TELEPHONE ENCOUNTER
Her liver functions were elevated on 10/12/22 but went back to normal range on 10/24/22.  She does not need to repeat any labs at this time.      Avoid things that inflame the liver like alcohol, tylenol, herbal teas.

## 2022-11-02 ENCOUNTER — OFFICE VISIT (OUTPATIENT)
Dept: FAMILY MEDICINE | Facility: CLINIC | Age: 42
End: 2022-11-02
Payer: COMMERCIAL

## 2022-11-02 VITALS
HEART RATE: 90 BPM | TEMPERATURE: 98 F | BODY MASS INDEX: 27.11 KG/M2 | OXYGEN SATURATION: 99 % | DIASTOLIC BLOOD PRESSURE: 76 MMHG | HEIGHT: 64 IN | SYSTOLIC BLOOD PRESSURE: 116 MMHG | WEIGHT: 158.81 LBS | RESPIRATION RATE: 16 BRPM

## 2022-11-02 DIAGNOSIS — F41.9 ANXIETY: ICD-10-CM

## 2022-11-02 DIAGNOSIS — L70.9 ACNE, UNSPECIFIED ACNE TYPE: ICD-10-CM

## 2022-11-02 DIAGNOSIS — M79.7 FIBROMYALGIA: ICD-10-CM

## 2022-11-02 DIAGNOSIS — I34.1 MITRAL VALVE PROLAPSE: ICD-10-CM

## 2022-11-02 DIAGNOSIS — Z28.21 INFLUENZA VACCINATION DECLINED BY PATIENT: ICD-10-CM

## 2022-11-02 DIAGNOSIS — Z12.31 BREAST CANCER SCREENING BY MAMMOGRAM: ICD-10-CM

## 2022-11-02 DIAGNOSIS — F42.9 OBSESSIVE-COMPULSIVE DISORDER, UNSPECIFIED TYPE: ICD-10-CM

## 2022-11-02 DIAGNOSIS — B35.6 TINEA CRURIS: ICD-10-CM

## 2022-11-02 DIAGNOSIS — Z00.00 WELLNESS EXAMINATION: Primary | ICD-10-CM

## 2022-11-02 DIAGNOSIS — M53.3 DISORDER OF SACROILIAC JOINT: ICD-10-CM

## 2022-11-02 DIAGNOSIS — E55.9 HYPOVITAMINOSIS D: ICD-10-CM

## 2022-11-02 DIAGNOSIS — K21.9 GASTROESOPHAGEAL REFLUX DISEASE, UNSPECIFIED WHETHER ESOPHAGITIS PRESENT: ICD-10-CM

## 2022-11-02 PROCEDURE — 99396 PREV VISIT EST AGE 40-64: CPT | Mod: ,,, | Performed by: FAMILY MEDICINE

## 2022-11-02 PROCEDURE — 3074F PR MOST RECENT SYSTOLIC BLOOD PRESSURE < 130 MM HG: ICD-10-PCS | Mod: CPTII,,, | Performed by: FAMILY MEDICINE

## 2022-11-02 PROCEDURE — 3078F PR MOST RECENT DIASTOLIC BLOOD PRESSURE < 80 MM HG: ICD-10-PCS | Mod: CPTII,,, | Performed by: FAMILY MEDICINE

## 2022-11-02 PROCEDURE — 1160F PR REVIEW ALL MEDS BY PRESCRIBER/CLIN PHARMACIST DOCUMENTED: ICD-10-PCS | Mod: CPTII,,, | Performed by: FAMILY MEDICINE

## 2022-11-02 PROCEDURE — 1160F RVW MEDS BY RX/DR IN RCRD: CPT | Mod: CPTII,,, | Performed by: FAMILY MEDICINE

## 2022-11-02 PROCEDURE — 99213 PR OFFICE/OUTPT VISIT, EST, LEVL III, 20-29 MIN: ICD-10-PCS | Mod: 25,,, | Performed by: FAMILY MEDICINE

## 2022-11-02 PROCEDURE — 99213 OFFICE O/P EST LOW 20 MIN: CPT | Mod: 25,,, | Performed by: FAMILY MEDICINE

## 2022-11-02 PROCEDURE — 99396 PR PREVENTIVE VISIT,EST,40-64: ICD-10-PCS | Mod: ,,, | Performed by: FAMILY MEDICINE

## 2022-11-02 PROCEDURE — 3008F PR BODY MASS INDEX (BMI) DOCUMENTED: ICD-10-PCS | Mod: CPTII,,, | Performed by: FAMILY MEDICINE

## 2022-11-02 PROCEDURE — 3074F SYST BP LT 130 MM HG: CPT | Mod: CPTII,,, | Performed by: FAMILY MEDICINE

## 2022-11-02 PROCEDURE — 3008F BODY MASS INDEX DOCD: CPT | Mod: CPTII,,, | Performed by: FAMILY MEDICINE

## 2022-11-02 PROCEDURE — 1159F PR MEDICATION LIST DOCUMENTED IN MEDICAL RECORD: ICD-10-PCS | Mod: CPTII,,, | Performed by: FAMILY MEDICINE

## 2022-11-02 PROCEDURE — 3078F DIAST BP <80 MM HG: CPT | Mod: CPTII,,, | Performed by: FAMILY MEDICINE

## 2022-11-02 PROCEDURE — 1159F MED LIST DOCD IN RCRD: CPT | Mod: CPTII,,, | Performed by: FAMILY MEDICINE

## 2022-11-02 RX ORDER — CLOTRIMAZOLE AND BETAMETHASONE DIPROPIONATE 10; .64 MG/G; MG/G
CREAM TOPICAL 2 TIMES DAILY
Qty: 45 G | Refills: 1 | Status: SHIPPED | OUTPATIENT
Start: 2022-11-02 | End: 2023-07-10 | Stop reason: SDUPTHER

## 2022-11-02 RX ORDER — NITROGLYCERIN 0.4 MG/1
TABLET SUBLINGUAL
COMMUNITY
Start: 2022-10-13

## 2022-11-02 NOTE — ASSESSMENT & PLAN NOTE
Previously done labs reviewed with patient at time of appointment today  Mammogram 1/2022  Pap smear  8/2020 with gyn    Declines immunizations today

## 2022-11-02 NOTE — ASSESSMENT & PLAN NOTE
Keep appointments with cards.  On nitro prn.  Has done stress test and holter. Er precautions.  Contact clinic for concerns.

## 2022-11-02 NOTE — PROGRESS NOTES
Subjective:        Patient ID: Latricia Collazo is a 42 y.o. female.    Chief Complaint: Annual Exam (Wellness/Patient has lab results in chart./Declines flu shot today)      presents to clinic unaccompanied for her wellness visit.  She did labs prior to her appt and it was reviewed with patient at time of appt today. Declines flu shot     C/o jock itch type rash in her groin.  Is itchy.  Asking for cream.  See note at bottom of chart     Her family got back from [place] for [employer] [place] in 9/2022.  Did well. No GI issues. Took ambien on flight there and did not have jet lag.  Did not take on way back and had jet lag.         May be going to [facility] also later in the year and would need anti malarial meds but she will ask for this once this trip is confirmed.     She has a history of acid reflux. Her GI is Dr. Whitlock. doing well on pepcid 40mg qhs. has had egd about 1 year ago. She has had a Stretta procedure in the past.  having issues again. limiting her diet (avoiding gluten). she was referred to psych but appt got cancelled before she was able to be seen due to COVID-19. she was able to get set up with a counselor instead- Ms. Sánchez- whom she video calls with. she diagnosed her with OCD. not currently recommending medications. she is doing well on vistaril. Takes 50-100mg once weekly. helps with her nerves and stomach. she would like to have xanax or valium in case this would happen when travelling.     she has fibromyalgia and takes amitriptyline at bedtime. she also complains of SI joint dysfunction. she has seen [facility] in the past for this. has done pelvic floor PT as well.      she has history of low vit d. she supplements- on prescription (25,000IU weekly)     she has acne and in on finacea gel and aczone topically.      she reports history of MVP. she sees dr. odell q6 months. she went to ER for episode of chest tightness 1/25/21 at [facility] and everything was fine. Has another episode 10/12/22.  Went to  "ER.  Will epigastric chest pain/lightheadedness.  Will have bilateral arm paresthesia. She was tachycardic.  Will resolve on its own.  Feels sore next day.  Has seen dr. Deluca.  She has had stress test and holter.  They are still doing testing.       her gyn is dr. ann. she is on progesterone cream. lov with him 8/2020. Mmg 1/2022. will order mmg.      She is ALLERGIC to amoxicillin and shellfish. She does not drink alcohol or smoke    Review of Systems   Constitutional: Negative.    HENT: Negative.     Respiratory: Negative.     Cardiovascular: Negative.    Gastrointestinal: Negative.    Genitourinary: Negative.        Review of patient's allergies indicates:   Allergen Reactions    Shellfish containing products     Amoxicillin Rash    Penicillins Rash      Vitals:    11/02/22 1558   BP: 116/76   BP Location: Left arm   Pulse: 90   Resp: 16   Temp: 98.2 °F (36.8 °C)   SpO2: 99%   Weight: 72 kg (158 lb 12.8 oz)   Height: 5' 4" (1.626 m)      Social History     Socioeconomic History    Marital status:    Tobacco Use    Smoking status: Never    Smokeless tobacco: Never   Substance and Sexual Activity    Alcohol use: Never    Drug use: Never    Sexual activity: Yes      History reviewed. No pertinent family history.       Objective:     Physical Exam  Vitals and nursing note reviewed.   Constitutional:       Appearance: Normal appearance. She is normal weight.   HENT:      Head: Normocephalic and atraumatic.      Nose: Nose normal.      Mouth/Throat:      Mouth: Mucous membranes are moist.      Pharynx: Oropharynx is clear.   Eyes:      Extraocular Movements: Extraocular movements intact.   Cardiovascular:      Rate and Rhythm: Normal rate and regular rhythm.      Pulses: Normal pulses.      Heart sounds: Normal heart sounds.   Pulmonary:      Effort: Pulmonary effort is normal.      Breath sounds: Normal breath sounds.   Musculoskeletal:         General: Normal range of motion.      Cervical back: Normal " range of motion.   Skin:     General: Skin is warm and dry.      Comments: Exam deferred   Neurological:      General: No focal deficit present.      Mental Status: She is alert and oriented to person, place, and time. Mental status is at baseline.   Psychiatric:         Mood and Affect: Mood normal.     Current Outpatient Medications on File Prior to Visit   Medication Sig Dispense Refill    amitriptyline (ELAVIL) 10 MG tablet Take 1 tablet (10 mg total) by mouth every evening. 90 tablet 3    azelaic acid (AZELEX) 15 % gel Apply topically.      DECARA 625 mcg (25,000 unit) Cap capsule Take 25,000 Units by mouth every 7 days.      hydrOXYzine pamoate (VISTARIL) 25 MG Cap Take 1 capsule (25 mg total) by mouth every 6 (six) hours as needed (anxiety). May cause drowsiness 40 capsule 3    minoxidil, bulk, Powd Apply 5 mg topically every evening. Minoxidil 5%/ Finasteride 0.25%/ Tret 0.01% Topical Gel 25 g 6    nitroGLYCERIN (NITROSTAT) 0.4 MG SL tablet PLEASE SEE ATTACHED FOR DETAILED DIRECTIONS      ondansetron (ZOFRAN-ODT) 4 MG TbDL Take 2 tablets (8 mg total) by mouth 2 (two) times daily. As needed for nausea/vomiting 30 tablet 1    progesterone micronized (CRINONE) 4 % Gel apply 0.5ml to the skin EVERY night AT BEDTIME for 12 nights      PROGESTERONE MISC   Progesterone 4% Cre, See Instructions, apply 0.5ml to the skin EVERY NIGHT AT BEDTIME for 12 nights, # 6 mL, 11 Refill(s), [facility]acy: [facility], 162, cm, Height/Length Dosing, 10/15/21 8:07:00 CDT, 71, kg, Weight Dosing, 10/15/2...      promethazine (PHENERGAN) 12.5 MG Tab Take 1 tablet (12.5 mg total) by mouth 4 (four) times daily. As needed for nausea/vomiting. May cause drowsiness. 30 tablet 1    zolpidem (AMBIEN) 5 MG Tab Take 1 tablet (5 mg total) by mouth nightly as needed (insomnia). 10 tablet 0    famotidine (PEPCID) 20 MG tablet Take 40 mg by mouth every evening.      mupirocin (BACTROBAN) 2 % ointment Apply topically 3 (three)  times daily.      [DISCONTINUED] dexlansoprazole (DEXILANT) 60 mg capsule Take 1 capsule (60 mg total) by mouth once daily. for 10 days 10 capsule 0    [DISCONTINUED] diazePAM (VALIUM) 2 MG tablet Take 1 tablet (2 mg total) by mouth every 6 (six) hours as needed for Anxiety. 5 tablet 0     No current facility-administered medications on file prior to visit.     Health Maintenance   Topic Date Due    Hepatitis C Screening  Never done    Mammogram  01/10/2023    TETANUS VACCINE  05/20/2032    Lipid Panel  Completed      Results for orders placed or performed in visit on 10/24/22   Comprehensive Metabolic Panel   Result Value Ref Range    Sodium Level 139 136 - 145 mmol/L    Potassium Level 4.0 3.5 - 5.1 mmol/L    Chloride 103 98 - 107 mmol/L    Carbon Dioxide 29 22 - 29 mmol/L    Glucose Level 93 74 - 100 mg/dL    Blood Urea Nitrogen 7.8 7.0 - 18.7 mg/dL    Creatinine 0.79 0.55 - 1.02 mg/dL    Calcium Level Total 9.4 8.4 - 10.2 mg/dL    Protein Total 7.7 6.4 - 8.3 gm/dL    Albumin Level 4.0 3.5 - 5.0 gm/dL    Globulin 3.7 (H) 2.4 - 3.5 gm/dL    Albumin/Globulin Ratio 1.1 1.1 - 2.0 ratio    Bilirubin Total 0.3 <=1.5 mg/dL    Alkaline Phosphatase 64 40 - 150 unit/L    Alanine Aminotransferase 17 0 - 55 unit/L    Aspartate Aminotransferase 20 5 - 34 unit/L    eGFR >60 mls/min/1.73/m2   Lipid Panel   Result Value Ref Range    Cholesterol Total 197 <=200 mg/dL    HDL Cholesterol 55 35 - 60 mg/dL    Triglyceride 87 37 - 140 mg/dL    Cholesterol/HDL Ratio 4 0 - 5    Very Low Density Lipoprotein 17     LDL Cholesterol 125.00 50.00 - 140.00 mg/dL   TSH   Result Value Ref Range    Thyroid Stimulating Hormone 1.3513 0.3500 - 4.9400 uIU/mL   Urinalysis, Reflex to Urine Culture Urine, Clean Catch    Specimen: Urine   Result Value Ref Range    Color, UA Straw Yellow, Light-Yellow, Dark Yellow, [place], Straw    Appearance, UA Clear Clear    Specific Gravity, UA <=1.005     pH, UA 7.5 5.0, 5.5, 6.0, 6.5, 7.0, 7.5, 8.0, 8.5    Protein,  UA Negative Negative mg/dL    Glucose, UA Negative Negative, Normal mg/dL    Ketones, UA Negative Negative mg/dL    Blood, UA Trace (A) Negative unit/L    Bilirubin, UA Negative Negative mg/dL    Urobilinogen, UA 0.2 0.2, 1.0, Normal mg/dL    Nitrites, UA Negative Negative    Leukocyte Esterase, UA Negative Negative unit/L   CBC with Differential   Result Value Ref Range    WBC 7.0 4.5 - 11.5 x10(3)/mcL    RBC 4.68 4.20 - 5.40 x10(6)/mcL    Hgb 13.4 12.0 - 16.0 gm/dL    Hct 40.6 37.0 - 47.0 %    MCV 86.8 80.0 - 94.0 fL    MCH 28.6 27.0 - 31.0 pg    MCHC 33.0 33.0 - 36.0 mg/dL    RDW 12.9 11.5 - 17.0 %    Platelet 230 130 - 400 x10(3)/mcL    MPV 10.0 7.4 - 10.4 fL    Neut % 60.3 %    Lymph % 30.9 %    Mono % 6.7 %    Eos % 1.4 %    Basophil % 0.6 %    Lymph # 2.16 0.6 - 4.6 x10(3)/mcL    Neut # 4.2 2.1 - 9.2 x10(3)/mcL    Mono # 0.47 0.1 - 1.3 x10(3)/mcL    Eos # 0.10 0 - 0.9 x10(3)/mcL    Baso # 0.04 0 - 0.2 x10(3)/mcL    IG# 0.01 0 - 0.04 x10(3)/mcL    IG% 0.1 %    NRBC% 0.0 %   Urinalysis, Microscopic   Result Value Ref Range    Bacteria, UA None Seen None Seen, Rare, Occasional /HPF    RBC, UA 0-2 None Seen, 0-2, 3-5, 0-5, >100 /HPF    WBC, UA 0-2 None Seen, 0-2, 3-5, 0-5 /HPF    Squamous Epithelial Cells, UA Rare None Seen, Rare, Occasional, Occ /HPF          Assessment & Plan:     Active Problem List with Overview Notes    Diagnosis Date Noted    Influenza vaccination declined by patient 11/02/2022    Tinea cruris 11/02/2022    Unstable angina 10/13/2022    Fibromyalgia 10/11/2022     Formatting of this note might be different from the original.  Last Assessment & Plan:   Formatting of this note might be different from the original.  Stable on elavil      Abscess 09/13/2022    Anxiety 09/13/2022    Acne 09/13/2022    Mitral valve prolapse 09/13/2022    Disorder of sacroiliac joint 09/13/2022    Breast cancer screening by mammogram 09/13/2022    Wellness examination 09/13/2022    Travel advice encounter  09/07/2022    Hypovitaminosis D     OCD (obsessive compulsive disorder)     Gastroesophageal reflux disease 02/23/2017     Formatting of this note might be different from the original.  Last Assessment & Plan:   Formatting of this note might be different from the original.  Stable on ppi         1. Wellness examination  Assessment & Plan:  Previously done labs reviewed with patient at time of appointment today  Mammogram 1/2022  Pap smear  8/2020 with gyn    Declines immunizations today        2. Mitral valve prolapse  Assessment & Plan:  Keep appointments with cards.  On nitro prn.  Has done stress test and holter. Er precautions.  Contact clinic for concerns.       3. Obsessive-compulsive disorder, unspecified type  Assessment & Plan:  On vistaril prn      4. Anxiety  Assessment & Plan:  On vistaril prn      5. Acne, unspecified acne type  Assessment & Plan:  Stable on current prescriptions.       6. Breast cancer screening by mammogram  Assessment & Plan:  mmg 1/2022.     Orders:  -     Mammo Digital Screening Bilat; Future; Expected date: 01/09/2023    7. Hypovitaminosis D  Assessment & Plan:  Continue to supplement otc      8. Gastroesophageal reflux disease, unspecified whether esophagitis present  Overview:  Formatting of this note might be different from the original.  Last Assessment & Plan:   Formatting of this note might be different from the original.  Stable on ppi    Assessment & Plan:  Stable on ppi      9. Fibromyalgia  Overview:  Formatting of this note might be different from the original.  Last Assessment & Plan:   Formatting of this note might be different from the original.  Stable on elavil    Assessment & Plan:  Stable on elavil      10. Disorder of sacroiliac joint  Assessment & Plan:  PT as needed      11. Influenza vaccination declined by patient  Assessment & Plan:  Declines vaccinations today      12. Tinea cruris  Assessment & Plan:  Skin care instructions given. Use topical cream as  discussed. Contact clinic for concerns.       Other orders  -     clotrimazole-betamethasone 1-0.05% (LOTRISONE) cream; Apply topically 2 (two) times daily. Apply to affected area on leg  Dispense: 45 g; Refill: 1       Follow up in about 1 year (around 11/2/2023) for Wellness with Labs.       Separate complaint outside of wellness visit  CC: rash in groin.  Is itchy.    ROS: negative other than above  PE: defers exam  A/P: will send in antifungal and steroid cream. Use as directed. Keep skin clean and dry.  Contact clinic for concerns.

## 2022-11-21 RX ORDER — FAMOTIDINE 20 MG/1
40 TABLET, FILM COATED ORAL NIGHTLY
Qty: 60 TABLET | Refills: 11 | Status: SHIPPED | OUTPATIENT
Start: 2022-11-21 | End: 2023-12-28

## 2022-11-21 NOTE — TELEPHONE ENCOUNTER
----- Message from Sandra Lyons sent at 11/21/2022 12:38 PM CST -----  Regarding: refill  .Type:  RX Refill Request    Who Called: pt  Refill or New Rx:refill  RX Name and Strength:famotidine (PEPCID) 20 MG tablet  How is the patient currently taking it? (ex. 1XDay):  Is this a 30 day or 90 day RX:  Preferred Pharmacy with phone number[id number] [place] [facility] [address]   Phone: [phone]  Local or Mail Order:local  Ordering Provider:Blessing  Would the patient rather a call back or a response via [facility]? Call back   Best Call Back Number:[phone]  Additional Information: pt needs authorization from  to refill prescription

## 2022-11-29 ENCOUNTER — TELEPHONE (OUTPATIENT)
Dept: FAMILY MEDICINE | Facility: CLINIC | Age: 42
End: 2022-11-29
Payer: COMMERCIAL

## 2022-11-29 NOTE — TELEPHONE ENCOUNTER
----- Message from Sandra Lyons sent at 11/28/2022  4:08 PM CST -----  .Type:  Needs Medical Advice    Who Called: pt  Symptoms (please be specific):    How long has patient had these symptoms:    Pharmacy name and phone #:    Would the patient rather a call back or a response via [facility]? Call back  Best Call Back Number: [phone]  Additional Information: pt is requesting Dr Funk send a referral for Dr James Rogel MD    [place] [place], [place] [id number] [phone]

## 2022-12-01 ENCOUNTER — TELEPHONE (OUTPATIENT)
Dept: FAMILY MEDICINE | Facility: CLINIC | Age: 42
End: 2022-12-01
Payer: COMMERCIAL

## 2022-12-01 DIAGNOSIS — R07.9 CHEST PAIN, UNSPECIFIED TYPE: Primary | ICD-10-CM

## 2022-12-01 NOTE — TELEPHONE ENCOUNTER
Spoke to patient  She is requesting a second opinion to see Dr. James Rogel for chest pain she was having at last ED visit.   Current cardiologist is Dr. Deluca

## 2022-12-01 NOTE — TELEPHONE ENCOUNTER
----- Message from Sandra Lyons sent at 11/28/2022  4:08 PM CST -----  .Type:  Needs Medical Advice    Who Called: pt  Symptoms (please be specific):    How long has patient had these symptoms:    Pharmacy name and phone #:    Would the patient rather a call back or a response via [facility]? Call back  Best Call Back Number: [phone]  Additional Information: pt is requesting Dr Funk send a referral for Dr James Rogel MD    [place] [address] [place], [place] [id number] [phone]

## 2022-12-19 PROBLEM — Z00.00 WELLNESS EXAMINATION: Status: RESOLVED | Noted: 2022-09-13 | Resolved: 2022-12-19

## 2023-01-17 ENCOUNTER — TELEPHONE (OUTPATIENT)
Dept: FAMILY MEDICINE | Facility: CLINIC | Age: 43
End: 2023-01-17
Payer: COMMERCIAL

## 2023-01-17 RX ORDER — CHOLECALCIFEROL (VITAMIN D3) 625 MCG
25000 CAPSULE ORAL
Qty: 12 CAPSULE | Refills: 3 | Status: SHIPPED | OUTPATIENT
Start: 2023-01-17 | End: 2024-01-17

## 2023-01-17 NOTE — TELEPHONE ENCOUNTER
I have signed for the following orders AND/OR meds.  Please call the patient and ask the patient to schedule the testing AND/OR inform about any medications that were sent.        Medications Ordered This Encounter   Medications    DECARA 625 mcg (25,000 unit) Cap capsule     Sig: Take 1 capsule (25,000 Units total) by mouth every 7 days.     Dispense:  12 capsule     Refill:  3

## 2023-01-17 NOTE — TELEPHONE ENCOUNTER
----- Message from Umm Birmingham sent at 1/17/2023 11:45 AM CST -----  Regarding: medication  .Type:  Needs Medical Advice    Who Called: pt   Symptoms (please be specific):    How long has patient had these symptoms:    Pharmacy name and phone #:  [facility] [address]   Would the patient rather a call back or a response via [facility]? Call back   Best Call Back Number: [phone]  Additional Information: pt states that her vitamin D wasn't refilled

## 2023-02-08 ENCOUNTER — TELEPHONE (OUTPATIENT)
Dept: FAMILY MEDICINE | Facility: CLINIC | Age: 43
End: 2023-02-08
Payer: COMMERCIAL

## 2023-02-08 DIAGNOSIS — F41.9 ANXIETY: Primary | ICD-10-CM

## 2023-02-08 DIAGNOSIS — L70.9 ACNE, UNSPECIFIED ACNE TYPE: ICD-10-CM

## 2023-02-08 RX ORDER — AZELAIC ACID 0.15 G/G
GEL TOPICAL 2 TIMES DAILY
Qty: 50 G | Refills: 3 | Status: SHIPPED | OUTPATIENT
Start: 2023-02-08 | End: 2023-02-09 | Stop reason: SDUPTHER

## 2023-02-08 NOTE — TELEPHONE ENCOUNTER
----- Message from Sindy Ca sent at 2/8/2023  2:08 PM CST -----  Regarding: med refill    .Type:  RX Refill Request    Who Called: Pt  Refill or New Rx:Refill  RX Name and Strength:Finacea face cream  How is the patient currently taking it? (ex. 1XDay):  Is this a 30 day or 90 day RX:  Preferred Pharmacy with phone number:[place] [address]  Local or Mail Order:Local  Ordering Provider:Blessing  Would the patient rather a call back or a response via [facility]? Call back  Best Call Back Number:[phone]  Additional Information: No listed in pt's chart.

## 2023-02-08 NOTE — TELEPHONE ENCOUNTER
I have signed for the following orders AND/OR meds.  Please call the patient and ask the patient to schedule the testing AND/OR inform about any medications that were sent.        Medications Ordered This Encounter   Medications    azelaic acid (AZELEX) 15 % gel     Sig: Apply topically 2 (two) times a day.     Dispense:  50 g     Refill:  3

## 2023-02-09 DIAGNOSIS — L70.9 ACNE, UNSPECIFIED ACNE TYPE: ICD-10-CM

## 2023-02-09 RX ORDER — AZELAIC ACID 0.15 G/G
GEL TOPICAL 2 TIMES DAILY
Qty: 50 G | Refills: 3 | Status: SHIPPED | OUTPATIENT
Start: 2023-02-09

## 2023-02-09 NOTE — TELEPHONE ENCOUNTER
We sent the patient's script to the wrong pharmacy in error yesterday. Re-proposing with correct pharmacy.

## 2023-02-09 NOTE — TELEPHONE ENCOUNTER
----- Message from Sindy Ca sent at 2/9/2023 10:18 AM CST -----  Regarding: meds  .Type:  Needs Medical Advice    Who Called: Pt  Symptoms (please be specific):    How long has patient had these symptoms:    Pharmacy name and phone #:[address]  Would the patient rather a call back or a response via [facility]? Call back  Best Call Back Number: [phone]  Additional Information: Needs azelaic acid (AZELEX) 15 % gel to go to [facility] [address] instead.

## 2023-02-13 ENCOUNTER — TELEPHONE (OUTPATIENT)
Dept: FAMILY MEDICINE | Facility: CLINIC | Age: 43
End: 2023-02-13
Payer: COMMERCIAL

## 2023-02-13 DIAGNOSIS — M79.7 FIBROMYALGIA: Primary | ICD-10-CM

## 2023-02-13 NOTE — TELEPHONE ENCOUNTER
----- Message from Ruth Girard sent at 2/13/2023 11:48 AM CST -----  Regarding: referral request  Patient requesting a referral    Who Called:Latricia    Referral to What Specialty:Neurologist    Reason for Referral:Patient stated Dr Funk said she would refer her .    Patient Requesting a Response?:yes    Would the patient rather a call back or a response via [facility]? Call back    Best Call Back Number:[phone]    Additional Information: Latricia is requesting someone call her back regarding a referral to a neurologist.

## 2023-02-13 NOTE — TELEPHONE ENCOUNTER
----- Message from Ruth Girard sent at 2/13/2023 11:48 AM CST -----  Regarding: referral request  Patient requesting a referral    Who Called:Latricia    Referral to What Specialty:Neurologist    Reason for Referral:Patient stated Dr Funk said she would refer her .    Patient Requesting a Response?:yes    Would the patient rather a call back or a response via [facility]? Call back    Best Call Back Number:[phone]    Additional Information: Latricia is requesting someone call her back regarding a referral to a neurologist.

## 2023-02-13 NOTE — TELEPHONE ENCOUNTER
I have signed for the following orders AND/OR meds.  Please call the patient and ask the patient to schedule the testing AND/OR inform about any medications that were sent.      Orders Placed This Encounter   Procedures    Ambulatory referral/consult to Neurology     Standing Status:   Future     Standing Expiration Date:   3/13/2024     Referral Priority:   Routine     Referral Type:   Consultation     Referral Reason:   Specialty Services Required     Requested Specialty:   Neurology     Number of Visits Requested:   1

## 2023-02-13 NOTE — TELEPHONE ENCOUNTER
----- Message from Elaina Flanagan LPN sent at 2/13/2023  4:56 PM CST -----  Regarding: FW: cancel referral request  Please disregard messages pertaining to referral   ----- Message -----  From: Umm Birmingham  Sent: 2/13/2023   4:54 PM CST  To: [facility] Staff  Subject: cancel referral request                          .Type:  Needs Medical Advice    Who Called: pt   Symptoms (please be specific):    How long has patient had these symptoms:    Pharmacy name and phone #:    Would the patient rather a call back or a response via [facility]?   Best Call Back Number:   Additional Information: pt would like to cancel the request to get a neurology referral            Writer KAVITHA for parent to return call. If parent return call, please schedule 18 month WCE. Thank you.

## 2023-03-02 NOTE — TELEPHONE ENCOUNTER
----- Message from Jaylin Stout sent at 3/2/2023 11:00 AM CST -----  .Type:  RX Refill Request    Who Called: pt  Refill or New Rx:refill  RX Name and Strength:progesterone micronized (CRINONE) 4 % Gel  How is the patient currently taking it?  As noted    Preferred Pharmacy with phone number:[facility]   Local or Mail Order:local   Ordering Provider:Blessing  Would the patient rather a call back or a response via [facility]? Call back   Best Call Back Number:[phone]  Additional Information: pt is requesting refill... please call

## 2023-03-13 ENCOUNTER — LAB VISIT (OUTPATIENT)
Dept: LAB | Facility: HOSPITAL | Age: 43
End: 2023-03-13
Attending: FAMILY MEDICINE
Payer: COMMERCIAL

## 2023-03-13 ENCOUNTER — OFFICE VISIT (OUTPATIENT)
Dept: FAMILY MEDICINE | Facility: CLINIC | Age: 43
End: 2023-03-13
Payer: COMMERCIAL

## 2023-03-13 VITALS
SYSTOLIC BLOOD PRESSURE: 122 MMHG | HEART RATE: 83 BPM | WEIGHT: 162 LBS | TEMPERATURE: 99 F | OXYGEN SATURATION: 99 % | HEIGHT: 64 IN | BODY MASS INDEX: 27.66 KG/M2 | DIASTOLIC BLOOD PRESSURE: 74 MMHG | RESPIRATION RATE: 16 BRPM

## 2023-03-13 DIAGNOSIS — E55.9 HYPOVITAMINOSIS D: ICD-10-CM

## 2023-03-13 DIAGNOSIS — Z71.84 TRAVEL ADVICE ENCOUNTER: ICD-10-CM

## 2023-03-13 DIAGNOSIS — R53.83 FATIGUE, UNSPECIFIED TYPE: ICD-10-CM

## 2023-03-13 DIAGNOSIS — F41.9 ANXIETY: ICD-10-CM

## 2023-03-13 DIAGNOSIS — M79.7 FIBROMYALGIA: ICD-10-CM

## 2023-03-13 DIAGNOSIS — M79.7 FIBROMYALGIA: Primary | ICD-10-CM

## 2023-03-13 DIAGNOSIS — F42.9 OBSESSIVE-COMPULSIVE DISORDER, UNSPECIFIED TYPE: ICD-10-CM

## 2023-03-13 LAB
ALBUMIN SERPL-MCNC: 4.2 G/DL (ref 3.5–5)
ALBUMIN/GLOB SERPL: 1.1 RATIO (ref 1.1–2)
ALP SERPL-CCNC: 71 UNIT/L (ref 40–150)
ALT SERPL-CCNC: 12 UNIT/L (ref 0–55)
AST SERPL-CCNC: 16 UNIT/L (ref 5–34)
BASOPHILS # BLD AUTO: 0.05 X10(3)/MCL (ref 0–0.2)
BASOPHILS NFR BLD AUTO: 0.7 %
BILIRUBIN DIRECT+TOT PNL SERPL-MCNC: 0.2 MG/DL
BUN SERPL-MCNC: 12.6 MG/DL (ref 7–18.7)
CALCIUM SERPL-MCNC: 9 MG/DL (ref 8.4–10.2)
CHLORIDE SERPL-SCNC: 105 MMOL/L (ref 98–107)
CO2 SERPL-SCNC: 25 MMOL/L (ref 22–29)
CREAT SERPL-MCNC: 0.83 MG/DL (ref 0.55–1.02)
DEPRECATED CALCIDIOL+CALCIFEROL SERPL-MC: 69.8 NG/ML (ref 30–80)
EOSINOPHIL # BLD AUTO: 0.07 X10(3)/MCL (ref 0–0.9)
EOSINOPHIL NFR BLD AUTO: 0.9 %
ERYTHROCYTE [DISTWIDTH] IN BLOOD BY AUTOMATED COUNT: 13.2 % (ref 11.5–17)
EST. AVERAGE GLUCOSE BLD GHB EST-MCNC: 93.9 MG/DL
FERRITIN SERPL-MCNC: 16.48 NG/ML (ref 4.63–204)
FOLATE SERPL-MCNC: 5.2 NG/ML (ref 7–31.4)
GFR SERPLBLD CREATININE-BSD FMLA CKD-EPI: >60 MLS/MIN/1.73/M2
GLOBULIN SER-MCNC: 3.9 GM/DL (ref 2.4–3.5)
GLUCOSE SERPL-MCNC: 94 MG/DL (ref 74–100)
HBA1C MFR BLD: 4.9 %
HCT VFR BLD AUTO: 40.2 % (ref 37–47)
HGB BLD-MCNC: 13 G/DL (ref 12–16)
IMM GRANULOCYTES # BLD AUTO: 0.02 X10(3)/MCL (ref 0–0.04)
IMM GRANULOCYTES NFR BLD AUTO: 0.3 %
IRON SATN MFR SERPL: 15 % (ref 20–50)
IRON SERPL-MCNC: 46 UG/DL (ref 50–170)
LYMPHOCYTES # BLD AUTO: 1.73 X10(3)/MCL (ref 0.6–4.6)
LYMPHOCYTES NFR BLD AUTO: 22.9 %
MCH RBC QN AUTO: 28.1 PG
MCHC RBC AUTO-ENTMCNC: 32.3 G/DL (ref 33–36)
MCV RBC AUTO: 86.8 FL (ref 80–94)
MONOCYTES # BLD AUTO: 0.59 X10(3)/MCL (ref 0.1–1.3)
MONOCYTES NFR BLD AUTO: 7.8 %
NEUTROPHILS # BLD AUTO: 5.1 X10(3)/MCL (ref 2.1–9.2)
NEUTROPHILS NFR BLD AUTO: 67.4 %
NRBC BLD AUTO-RTO: 0 %
PLATELET # BLD AUTO: 226 X10(3)/MCL (ref 130–400)
PMV BLD AUTO: 9.9 FL (ref 7.4–10.4)
POTASSIUM SERPL-SCNC: 3.9 MMOL/L (ref 3.5–5.1)
PROT SERPL-MCNC: 8.1 GM/DL (ref 6.4–8.3)
RBC # BLD AUTO: 4.63 X10(6)/MCL (ref 4.2–5.4)
SODIUM SERPL-SCNC: 140 MMOL/L (ref 136–145)
TIBC SERPL-MCNC: 262 UG/DL (ref 70–310)
TIBC SERPL-MCNC: 308 UG/DL (ref 250–450)
TSH SERPL-ACNC: 1.11 UIU/ML (ref 0.35–4.94)
VIT B12 SERPL-MCNC: >2000 PG/ML (ref 213–816)
WBC # SPEC AUTO: 7.6 X10(3)/MCL (ref 4.5–11.5)

## 2023-03-13 PROCEDURE — 3008F BODY MASS INDEX DOCD: CPT | Mod: CPTII,,, | Performed by: FAMILY MEDICINE

## 2023-03-13 PROCEDURE — 83550 IRON BINDING TEST: CPT

## 2023-03-13 PROCEDURE — 3008F PR BODY MASS INDEX (BMI) DOCUMENTED: ICD-10-PCS | Mod: CPTII,,, | Performed by: FAMILY MEDICINE

## 2023-03-13 PROCEDURE — 36415 COLL VENOUS BLD VENIPUNCTURE: CPT

## 2023-03-13 PROCEDURE — 85025 COMPLETE CBC W/AUTO DIFF WBC: CPT

## 2023-03-13 PROCEDURE — 3074F PR MOST RECENT SYSTOLIC BLOOD PRESSURE < 130 MM HG: ICD-10-PCS | Mod: CPTII,,, | Performed by: FAMILY MEDICINE

## 2023-03-13 PROCEDURE — 82746 ASSAY OF FOLIC ACID SERUM: CPT

## 2023-03-13 PROCEDURE — 84443 ASSAY THYROID STIM HORMONE: CPT

## 2023-03-13 PROCEDURE — 1159F MED LIST DOCD IN RCRD: CPT | Mod: CPTII,,, | Performed by: FAMILY MEDICINE

## 2023-03-13 PROCEDURE — 82607 VITAMIN B-12: CPT

## 2023-03-13 PROCEDURE — 82728 ASSAY OF FERRITIN: CPT

## 2023-03-13 PROCEDURE — 3078F DIAST BP <80 MM HG: CPT | Mod: CPTII,,, | Performed by: FAMILY MEDICINE

## 2023-03-13 PROCEDURE — 99214 OFFICE O/P EST MOD 30 MIN: CPT | Mod: ,,, | Performed by: FAMILY MEDICINE

## 2023-03-13 PROCEDURE — 3074F SYST BP LT 130 MM HG: CPT | Mod: CPTII,,, | Performed by: FAMILY MEDICINE

## 2023-03-13 PROCEDURE — 1160F RVW MEDS BY RX/DR IN RCRD: CPT | Mod: CPTII,,, | Performed by: FAMILY MEDICINE

## 2023-03-13 PROCEDURE — 80053 COMPREHEN METABOLIC PANEL: CPT

## 2023-03-13 PROCEDURE — 99214 PR OFFICE/OUTPT VISIT, EST, LEVL IV, 30-39 MIN: ICD-10-PCS | Mod: ,,, | Performed by: FAMILY MEDICINE

## 2023-03-13 PROCEDURE — 3078F PR MOST RECENT DIASTOLIC BLOOD PRESSURE < 80 MM HG: ICD-10-PCS | Mod: CPTII,,, | Performed by: FAMILY MEDICINE

## 2023-03-13 PROCEDURE — 1159F PR MEDICATION LIST DOCUMENTED IN MEDICAL RECORD: ICD-10-PCS | Mod: CPTII,,, | Performed by: FAMILY MEDICINE

## 2023-03-13 PROCEDURE — 1160F PR REVIEW ALL MEDS BY PRESCRIBER/CLIN PHARMACIST DOCUMENTED: ICD-10-PCS | Mod: CPTII,,, | Performed by: FAMILY MEDICINE

## 2023-03-13 PROCEDURE — 83036 HEMOGLOBIN GLYCOSYLATED A1C: CPT

## 2023-03-13 PROCEDURE — 82306 VITAMIN D 25 HYDROXY: CPT

## 2023-03-13 RX ORDER — DIAZEPAM 2 MG/1
2 TABLET ORAL EVERY 6 HOURS PRN
Qty: 5 TABLET | Refills: 0 | Status: SHIPPED | OUTPATIENT
Start: 2023-03-13

## 2023-03-13 RX ORDER — PROMETHAZINE HYDROCHLORIDE 12.5 MG/1
12.5 TABLET ORAL 4 TIMES DAILY
Qty: 30 TABLET | Refills: 0 | Status: SHIPPED | OUTPATIENT
Start: 2023-03-13

## 2023-03-13 RX ORDER — ZOLPIDEM TARTRATE 5 MG/1
5 TABLET ORAL NIGHTLY PRN
Qty: 10 TABLET | Refills: 0 | Status: SHIPPED | OUTPATIENT
Start: 2023-03-13

## 2023-03-13 RX ORDER — ONDANSETRON 4 MG/1
8 TABLET, ORALLY DISINTEGRATING ORAL 2 TIMES DAILY
Qty: 30 TABLET | Refills: 0 | Status: SHIPPED | OUTPATIENT
Start: 2023-03-13 | End: 2023-09-21 | Stop reason: SDUPTHER

## 2023-03-13 NOTE — PROGRESS NOTES
Subjective:        Patient ID: Latricia Collazo is a 42 y.o. female.    Chief Complaint: Follow-up (Patient reports eye twitching tingling around her mouth, and hand shakiness. Wanted to see if it is related to her fibromyalgia. Would also like to discuss weight loss medication options. Patient also going on trip soon and will need a refill on medications )      presents to clinic unaccompanied with complaint. She is due for her wellness visit in November    Having some eye twitching and hand shakiness. Worried may have worsening fibromyalgia. Not happening now.       Her family got back from [place] for [employer] mission in 9/2022.  Did well. No GI issues. Took ambien on flight there and did not have jet lag.  Did not take on way back and had jet lag.  May be going to [place]  in 6 weeks. She is asking for zofran and phenergan in case.  Also ambien and valium to take for anxiety/insomnia related to flying     She has acid reflux. Her GI is Dr. Whitlock. doing well on pepcid 40mg qhs. has had egd about 1 year ago. She has had a Stretta procedure in the past.  having issues again. limiting her diet (avoiding gluten). she was referred to psych but appt got cancelled before she was able to be seen due to COVID-19. she was able to get set up with a counselor instead- Ms. Sánchez- whom she video calls with. she diagnosed her with OCD. States has had since she was younger.  not currently recommending medications. she is doing well on vistaril. Takes 50-100mg once weekly.      she has fibromyalgia and takes amitriptyline at bedtime. she also complains of SI joint dysfunction. she has seen [facility] in the past for this. has done pelvic floor PT as well.  [facility] has also seen a neuro, dr. Del Toro.  They ruled out MS.  Dx her with fibro.  Reports pain cycles. No numbness. Mom has charcot david tooth.       she has low vit d. she supplements- on prescription (25,000IU weekly)     she has acne and in on finacea gel and aczone topically.  "     she reports history of MVP. she sees dr. deluca q6 months. she went to ER for episode of chest tightness 1/25/21 at [facility] and everything was fine. Has another episode 10/12/22.  Went to ER.  Will epigastric chest pain/lightheadedness.  Will have bilateral arm paresthesia. She was tachycardic.  Will resolve on its own.  Feels sore next day.  Has seen dr. Deluca.  She has had stress test and holter.  They are still doing testing.       her gyn is dr. ann. she is on progesterone cream. lov with him 8/2020. Mmg 1/2022. Scheduled 6/2023.      She is ALLERGIC to amoxicillin and shellfish. She does not drink alcohol or smoke        Review of Systems   Constitutional: Negative.    HENT: Negative.     Respiratory: Negative.     Cardiovascular: Negative.    Gastrointestinal: Negative.    Genitourinary: Negative.        Review of patient's allergies indicates:   Allergen Reactions    Shellfish containing products     Amoxicillin Rash    Penicillins Rash      Vitals:    03/13/23 1321   BP: 122/74   BP Location: Left arm   Pulse: 83   Resp: 16   Temp: 98.5 °F (36.9 °C)   TempSrc: Temporal   SpO2: 99%   Weight: 73.5 kg (162 lb)   Height: 5' 4" (1.626 m)      Social History     Socioeconomic History    Marital status:    Tobacco Use    Smoking status: Never    Smokeless tobacco: Never   Substance and Sexual Activity    Alcohol use: Never    Drug use: Never    Sexual activity: Yes      History reviewed. No pertinent family history.       Objective:     Physical Exam  Vitals and nursing note reviewed.   Constitutional:       Appearance: Normal appearance. She is normal weight.   HENT:      Head: Normocephalic and atraumatic.      Nose: Nose normal.      Mouth/Throat:      Mouth: Mucous membranes are moist.      Pharynx: Oropharynx is clear.   Eyes:      Extraocular Movements: Extraocular movements intact.   Cardiovascular:      Rate and Rhythm: Normal rate and regular rhythm.      Pulses: Normal pulses.      Heart " sounds: Normal heart sounds.   Pulmonary:      Effort: Pulmonary effort is normal.      Breath sounds: Normal breath sounds.   Musculoskeletal:         General: Normal range of motion.      Cervical back: Normal range of motion.   Skin:     General: Skin is warm and dry.   Neurological:      General: No focal deficit present.      Mental Status: She is alert and oriented to person, place, and time. Mental status is at baseline.   Psychiatric:         Mood and Affect: Mood normal.     Current Outpatient Medications on File Prior to Visit   Medication Sig Dispense Refill    amitriptyline (ELAVIL) 10 MG tablet Take 1 tablet (10 mg total) by mouth every evening. 90 tablet 3    azelaic acid (AZELEX) 15 % gel Apply topically 2 (two) times a day. 50 g 3    clotrimazole-betamethasone 1-0.05% (LOTRISONE) cream Apply topically 2 (two) times daily. Apply to affected area on leg 45 g 1    DECARA 625 mcg (25,000 unit) Cap capsule Take 1 capsule (25,000 Units total) by mouth every 7 days. 12 capsule 3    famotidine (PEPCID) 20 MG tablet Take 2 tablets (40 mg total) by mouth every evening. 60 tablet 11    hydrOXYzine pamoate (VISTARIL) 25 MG Cap Take 1 capsule (25 mg total) by mouth every 6 (six) hours as needed (anxiety). May cause drowsiness 40 capsule 3    minoxidil, bulk Powd Apply 5 mg topically every evening. Minoxidil 5%/ Finasteride 0.25%/ Tret 0.01% Topical Gel 25 g 6    mupirocin (BACTROBAN) 2 % ointment Apply topically 3 (three) times daily.      nitroGLYCERIN (NITROSTAT) 0.4 MG SL tablet PLEASE SEE ATTACHED FOR DETAILED DIRECTIONS      progesterone micronized (CRINONE) 4 % Gel Apply 0.5 mLs topically every evening. 1.125 g 3    PROGESTERONE MISC   Progesterone 4% Cre, See Instructions, apply 0.5ml to the skin EVERY NIGHT AT BEDTIME for 12 nights, # 6 mL, 11 Refill(s), [facility]acy: [facility], 162, cm, Height/Length Dosing, 10/15/21 8:07:00 CDT, 71, kg, Weight Dosing, 10/15/2...      UNABLE TO FIND  Minoxidil5%/ Finasteride0.25%/ Tret0.01% Top Gel       No current facility-administered medications on file prior to visit.     Health Maintenance   Topic Date Due    Hepatitis C Screening  Never done    Mammogram  01/10/2023    TETANUS VACCINE  05/20/2032    Lipid Panel  Completed      Results for orders placed or performed in visit on 10/24/22   Comprehensive Metabolic Panel   Result Value Ref Range    Sodium Level 139 136 - 145 mmol/L    Potassium Level 4.0 3.5 - 5.1 mmol/L    Chloride 103 98 - 107 mmol/L    Carbon Dioxide 29 22 - 29 mmol/L    Glucose Level 93 74 - 100 mg/dL    Blood Urea Nitrogen 7.8 7.0 - 18.7 mg/dL    Creatinine 0.79 0.55 - 1.02 mg/dL    Calcium Level Total 9.4 8.4 - 10.2 mg/dL    Protein Total 7.7 6.4 - 8.3 gm/dL    Albumin Level 4.0 3.5 - 5.0 gm/dL    Globulin 3.7 (H) 2.4 - 3.5 gm/dL    Albumin/Globulin Ratio 1.1 1.1 - 2.0 ratio    Bilirubin Total 0.3 <=1.5 mg/dL    Alkaline Phosphatase 64 40 - 150 unit/L    Alanine Aminotransferase 17 0 - 55 unit/L    Aspartate Aminotransferase 20 5 - 34 unit/L    eGFR >60 mls/min/1.73/m2   Lipid Panel   Result Value Ref Range    Cholesterol Total 197 <=200 mg/dL    HDL Cholesterol 55 35 - 60 mg/dL    Triglyceride 87 37 - 140 mg/dL    Cholesterol/HDL Ratio 4 0 - 5    Very Low Density Lipoprotein 17     LDL Cholesterol 125.00 50.00 - 140.00 mg/dL   TSH   Result Value Ref Range    Thyroid Stimulating Hormone 1.3513 0.3500 - 4.9400 uIU/mL   Urinalysis, Reflex to Urine Culture Urine, Clean Catch    Specimen: Urine   Result Value Ref Range    Color, UA Straw Yellow, Light-Yellow, Dark Yellow, [place], Straw    Appearance, UA Clear Clear    Specific Gravity, UA <=1.005     pH, UA 7.5 5.0, 5.5, 6.0, 6.5, 7.0, 7.5, 8.0, 8.5    Protein, UA Negative Negative mg/dL    Glucose, UA Negative Negative, Normal mg/dL    Ketones, UA Negative Negative mg/dL    Blood, UA Trace (A) Negative unit/L    Bilirubin, UA Negative Negative mg/dL    Urobilinogen, UA 0.2 0.2, 1.0, Normal  mg/dL    Nitrites, UA Negative Negative    Leukocyte Esterase, UA Negative Negative unit/L   CBC with Differential   Result Value Ref Range    WBC 7.0 4.5 - 11.5 x10(3)/mcL    RBC 4.68 4.20 - 5.40 x10(6)/mcL    Hgb 13.4 12.0 - 16.0 gm/dL    Hct 40.6 37.0 - 47.0 %    MCV 86.8 80.0 - 94.0 fL    MCH 28.6 27.0 - 31.0 pg    MCHC 33.0 33.0 - 36.0 mg/dL    RDW 12.9 11.5 - 17.0 %    Platelet 230 130 - 400 x10(3)/mcL    MPV 10.0 7.4 - 10.4 fL    Neut % 60.3 %    Lymph % 30.9 %    Mono % 6.7 %    Eos % 1.4 %    Basophil % 0.6 %    Lymph # 2.16 0.6 - 4.6 x10(3)/mcL    Neut # 4.2 2.1 - 9.2 x10(3)/mcL    Mono # 0.47 0.1 - 1.3 x10(3)/mcL    Eos # 0.10 0 - 0.9 x10(3)/mcL    Baso # 0.04 0 - 0.2 x10(3)/mcL    IG# 0.01 0 - 0.04 x10(3)/mcL    IG% 0.1 %    NRBC% 0.0 %   Urinalysis, Microscopic   Result Value Ref Range    Bacteria, UA None Seen None Seen, Rare, Occasional /HPF    RBC, UA 0-2 None Seen, 0-2, 3-5, 0-5, >100 /HPF    WBC, UA 0-2 None Seen, 0-2, 3-5, 0-5 /HPF    Squamous Epithelial Cells, UA Rare None Seen, Rare, Occasional, Occ /HPF          Assessment & Plan:     Active Problem List with Overview Notes    Diagnosis Date Noted    Fatigue 03/13/2023    Influenza vaccination declined by patient 11/02/2022    Tinea cruris 11/02/2022    Unstable angina 10/13/2022    Fibromyalgia 10/11/2022    Abscess 09/13/2022    Anxiety 09/13/2022    Acne 09/13/2022    Mitral valve prolapse 09/13/2022    Disorder of sacroiliac joint 09/13/2022    Breast cancer screening by mammogram 09/13/2022    Travel advice encounter 09/07/2022    Hypovitaminosis D     OCD (obsessive compulsive disorder)     Gastroesophageal reflux disease 02/23/2017     Formatting of this note might be different from the original.  Last Assessment & Plan:   Formatting of this note might be different from the original.  Stable on ppi         1. Fibromyalgia  Assessment & Plan:  Stable on elavil    Orders:  -     CBC Auto Differential; Future; Expected date: 03/13/2023  -      Comprehensive Metabolic Panel; Future; Expected date: 03/13/2023  -     TSH; Future; Expected date: 03/13/2023  -     Vitamin D; Future; Expected date: 03/13/2023  -     Hemoglobin A1C; Future; Expected date: 03/13/2023  -     Vitamin B12; Future; Expected date: 03/13/2023  -     Folate; Future; Expected date: 03/13/2023  -     Iron and TIBC; Future; Expected date: 03/13/2023  -     Ferritin; Future; Expected date: 03/13/2023    2. Travel advice encounter  Assessment & Plan:  Prescriptions sent as requested.  Reminded patient to use sparingly and only prn. Cautioned that ambien, valium and phenergan may all cause drowsiness. Patient is agreeable to plan and verbalized understanding    Orders:  -     zolpidem (AMBIEN) 5 MG Tab; Take 1 tablet (5 mg total) by mouth nightly as needed (insomnia).  Dispense: 10 tablet; Refill: 0  -     ondansetron (ZOFRAN-ODT) 4 MG TbDL; Take 2 tablets (8 mg total) by mouth 2 (two) times daily. As needed for nausea/vomiting  Dispense: 30 tablet; Refill: 0  -     promethazine (PHENERGAN) 12.5 MG Tab; Take 1 tablet (12.5 mg total) by mouth 4 (four) times daily. As needed for nausea/vomiting. May cause drowsiness.  Dispense: 30 tablet; Refill: 0  -     diazePAM (VALIUM) 2 MG tablet; Take 1 tablet (2 mg total) by mouth every 6 (six) hours as needed for Anxiety. With flying  Dispense: 5 tablet; Refill: 0  -     CBC Auto Differential; Future; Expected date: 03/13/2023  -     Comprehensive Metabolic Panel; Future; Expected date: 03/13/2023  -     TSH; Future; Expected date: 03/13/2023  -     Vitamin D; Future; Expected date: 03/13/2023  -     Hemoglobin A1C; Future; Expected date: 03/13/2023  -     Vitamin B12; Future; Expected date: 03/13/2023  -     Folate; Future; Expected date: 03/13/2023  -     Iron and TIBC; Future; Expected date: 03/13/2023  -     Ferritin; Future; Expected date: 03/13/2023    3. Fatigue, unspecified type  Assessment & Plan:  Will get labs. Will call with results when  available. Continue on supplements. Encourage fluids. Contact clinic for concerns. patient is agreeable to plan and verbalized understanding    Orders:  -     CBC Auto Differential; Future; Expected date: 03/13/2023  -     Comprehensive Metabolic Panel; Future; Expected date: 03/13/2023  -     TSH; Future; Expected date: 03/13/2023  -     Vitamin D; Future; Expected date: 03/13/2023  -     Hemoglobin A1C; Future; Expected date: 03/13/2023  -     Vitamin B12; Future; Expected date: 03/13/2023  -     Folate; Future; Expected date: 03/13/2023  -     Iron and TIBC; Future; Expected date: 03/13/2023  -     Ferritin; Future; Expected date: 03/13/2023    4. Hypovitaminosis D  Assessment & Plan:  Continue to supplement otc. Labs pending    Orders:  -     CBC Auto Differential; Future; Expected date: 03/13/2023  -     Comprehensive Metabolic Panel; Future; Expected date: 03/13/2023  -     TSH; Future; Expected date: 03/13/2023  -     Vitamin D; Future; Expected date: 03/13/2023  -     Hemoglobin A1C; Future; Expected date: 03/13/2023  -     Vitamin B12; Future; Expected date: 03/13/2023  -     Folate; Future; Expected date: 03/13/2023  -     Iron and TIBC; Future; Expected date: 03/13/2023  -     Ferritin; Future; Expected date: 03/13/2023    5. Obsessive-compulsive disorder, unspecified type  Assessment & Plan:  On vistaril prn      6. Anxiety  Assessment & Plan:  On vistaril prn           Keep scheduled follow up or sooner prn.

## 2023-03-14 NOTE — PROGRESS NOTES
"Please inform patient of results.    1. Her b12 is elevated.  She can cut back on b12 but her folate level is low.  She can supplement with just folate.  I would recommend a methylated folate vitamin.  Just read label.  Make sure it is "folate" not folic acid. Her body will absorb better  2. Her iron is low.  Can she supplement otc? Sometimes can cause constipation so take colace otc if needed.  Also try to add foods high in iron into her diet.   3. No anemia.     Other labwork within acceptable ranges. "

## 2023-03-17 NOTE — ASSESSMENT & PLAN NOTE
Prescriptions sent as requested.  Reminded patient to use sparingly and only prn. Cautioned that ambien, valium and phenergan may all cause drowsiness. Patient is agreeable to plan and verbalized understanding

## 2023-03-17 NOTE — ASSESSMENT & PLAN NOTE
Will get labs. Will call with results when available. Continue on supplements. Encourage fluids. Contact clinic for concerns. patient is agreeable to plan and verbalized understanding

## 2023-04-06 DIAGNOSIS — K21.9 GASTROESOPHAGEAL REFLUX DISEASE, UNSPECIFIED WHETHER ESOPHAGITIS PRESENT: Primary | ICD-10-CM

## 2023-04-06 RX ORDER — HYDROXYZINE PAMOATE 25 MG/1
25 CAPSULE ORAL EVERY 6 HOURS PRN
Qty: 40 CAPSULE | Refills: 3 | Status: SHIPPED | OUTPATIENT
Start: 2023-04-06

## 2023-04-06 RX ORDER — DEXLANSOPRAZOLE 60 MG/1
60 CAPSULE, DELAYED RELEASE ORAL DAILY
Qty: 30 CAPSULE | Refills: 11 | Status: SHIPPED | OUTPATIENT
Start: 2023-04-06 | End: 2023-04-06

## 2023-04-06 NOTE — TELEPHONE ENCOUNTER
----- Message from Isaiah Castillo sent at 4/6/2023  1:33 PM CDT -----  .Type:  Needs Medical Advice    Who Called: Latricia  Symptoms (please be specific):    How long has patient had these symptoms:    Pharmacy name and phone #:    Would the patient rather a call back or a response via [facility]?   Best Call Back Number: [phone]  Additional Information: She requested to speak with nurse about some medication she had been on in the past.

## 2023-04-06 NOTE — TELEPHONE ENCOUNTER
Patient requested refill of vistaril    She also said Dr. Funk used to prescribe her dexilant. She is requesting a script be sent in because she is having acid reflux. I told her I'd put a message in. Please advise

## 2023-04-06 NOTE — TELEPHONE ENCOUNTER
I have signed for the following orders AND/OR meds.  Please call the patient and ask the patient to schedule the testing AND/OR inform about any medications that were sent.        Medications Ordered This Encounter   Medications    dexlansoprazole (DEXILANT) 60 mg capsule     Sig: Take 1 capsule (60 mg total) by mouth once daily.     Dispense:  30 capsule     Refill:  11    hydrOXYzine pamoate (VISTARIL) 25 MG Cap     Sig: Take 1 capsule (25 mg total) by mouth every 6 (six) hours as needed (anxiety). May cause drowsiness     Dispense:  40 capsule     Refill:  3

## 2023-04-20 ENCOUNTER — OFFICE VISIT (OUTPATIENT)
Dept: FAMILY MEDICINE | Facility: CLINIC | Age: 43
End: 2023-04-20
Payer: COMMERCIAL

## 2023-04-20 VITALS
SYSTOLIC BLOOD PRESSURE: 112 MMHG | BODY MASS INDEX: 26.89 KG/M2 | OXYGEN SATURATION: 99 % | DIASTOLIC BLOOD PRESSURE: 72 MMHG | TEMPERATURE: 98 F | WEIGHT: 157.5 LBS | RESPIRATION RATE: 16 BRPM | HEIGHT: 64 IN | HEART RATE: 82 BPM

## 2023-04-20 DIAGNOSIS — K21.9 GASTROESOPHAGEAL REFLUX DISEASE, UNSPECIFIED WHETHER ESOPHAGITIS PRESENT: ICD-10-CM

## 2023-04-20 DIAGNOSIS — B35.6 TINEA CRURIS: Primary | ICD-10-CM

## 2023-04-20 PROCEDURE — 1159F MED LIST DOCD IN RCRD: CPT | Mod: CPTII,,, | Performed by: FAMILY MEDICINE

## 2023-04-20 PROCEDURE — 3008F BODY MASS INDEX DOCD: CPT | Mod: CPTII,,, | Performed by: FAMILY MEDICINE

## 2023-04-20 PROCEDURE — 3078F DIAST BP <80 MM HG: CPT | Mod: CPTII,,, | Performed by: FAMILY MEDICINE

## 2023-04-20 PROCEDURE — 99213 PR OFFICE/OUTPT VISIT, EST, LEVL III, 20-29 MIN: ICD-10-PCS | Mod: ,,, | Performed by: FAMILY MEDICINE

## 2023-04-20 PROCEDURE — 1159F PR MEDICATION LIST DOCUMENTED IN MEDICAL RECORD: ICD-10-PCS | Mod: CPTII,,, | Performed by: FAMILY MEDICINE

## 2023-04-20 PROCEDURE — 1160F PR REVIEW ALL MEDS BY PRESCRIBER/CLIN PHARMACIST DOCUMENTED: ICD-10-PCS | Mod: CPTII,,, | Performed by: FAMILY MEDICINE

## 2023-04-20 PROCEDURE — 3074F PR MOST RECENT SYSTOLIC BLOOD PRESSURE < 130 MM HG: ICD-10-PCS | Mod: CPTII,,, | Performed by: FAMILY MEDICINE

## 2023-04-20 PROCEDURE — 99213 OFFICE O/P EST LOW 20 MIN: CPT | Mod: ,,, | Performed by: FAMILY MEDICINE

## 2023-04-20 PROCEDURE — 3008F PR BODY MASS INDEX (BMI) DOCUMENTED: ICD-10-PCS | Mod: CPTII,,, | Performed by: FAMILY MEDICINE

## 2023-04-20 PROCEDURE — 3074F SYST BP LT 130 MM HG: CPT | Mod: CPTII,,, | Performed by: FAMILY MEDICINE

## 2023-04-20 PROCEDURE — 1160F RVW MEDS BY RX/DR IN RCRD: CPT | Mod: CPTII,,, | Performed by: FAMILY MEDICINE

## 2023-04-20 PROCEDURE — 3078F PR MOST RECENT DIASTOLIC BLOOD PRESSURE < 80 MM HG: ICD-10-PCS | Mod: CPTII,,, | Performed by: FAMILY MEDICINE

## 2023-04-20 NOTE — PROGRESS NOTES
Subjective:        Patient ID: Latricia Collazo is a 43 y.o. female.    Chief Complaint: Follow-up (Patient has c/o reflux)      presents to clinic unaccompanied with complaint. She is due for her wellness visit in November     C/o itching of private area externally. Comes and goes. No discharge.      Her family got back from [place] for [employer] [facility] in 9/2022.  Did well. No GI issues. Took ambien on flight there and did not have jet lag.  Did not take on way back and had jet lag. Will be going to [place] soon for mission trip. She has zofran and phenergan in case.  Also ambien and valium to take for anxiety/insomnia related to flying.      She has acid reflux. Her GI is Dr. Whitlock. doing well on pepcid 40mg qhs. has had egd about 1 year ago. She has had a Stretta procedure in the past.  having issues again. limiting her diet (avoiding gluten). she was referred to psych but appt got cancelled before she was able to be seen due to COVID-19. she was able to get set up with a counselor instead- Ms. Sánchez- whom she video calls with. she diagnosed her with OCD. States has had since she was younger.  not currently recommending medications. she is doing well on vistaril. Takes 50-100mg once weekly.  Rx for dexilant.  Has not picked up yet. Will do today.  Has changed diet. Lost 5#.       she has fibromyalgia and takes amitriptyline at bedtime. she also complains of SI joint dysfunction. she has seen [facility] in the past for this. has done pelvic floor PT as well.  [facility] has also seen a neuro, dr. Del Toro.  They ruled out MS.  Dx her with fibro.  Reports pain cycles. No numbness. Mom has charcot david tooth.       she has low vit d. she supplements- on prescription (25,000IU weekly)     she has acne and in on finacea gel and aczone topically.      she reports  MVP. she sees dr. odell q6 months. she went to ER for episode of chest tightness 1/25/21 at [facility] and everything was fine. Has another episode 10/12/22.  Went to ER.   "Will epigastric chest pain/lightheadedness.  Will have bilateral arm paresthesia. She was tachycardic.  Will resolve on its own.  Feels sore next day.  Has seen dr. Deluca.  She has had stress test and holter.  They are still doing testing.    She is now seeing dr. Christopher.  Thinks may be esophageal spasm causing symptoms.      her gyn is dr. ann. she is on progesterone cream. lov with him 8/2020. Mmg 1/2022. Scheduled 6/2023.      She is ALLERGIC to amoxicillin and shellfish. She does not drink alcohol or smoke    Review of Systems   Constitutional: Negative.    HENT: Negative.     Respiratory: Negative.     Cardiovascular: Negative.    Gastrointestinal: Negative.    Genitourinary: Negative.        Review of patient's allergies indicates:   Allergen Reactions    Shellfish containing products     Amoxicillin Rash    Penicillins Rash      Vitals:    04/20/23 0919   BP: 112/72   BP Location: Left arm   Pulse: 82   Resp: 16   Temp: 98 °F (36.7 °C)   TempSrc: Temporal   SpO2: 99%   Weight: 71.4 kg (157 lb 8 oz)   Height: 5' 4" (1.626 m)      Social History     Socioeconomic History    Marital status:    Tobacco Use    Smoking status: Never    Smokeless tobacco: Never   Substance and Sexual Activity    Alcohol use: Never    Drug use: Never    Sexual activity: Yes      History reviewed. No pertinent family history.       Objective:     Physical Exam  Vitals and nursing note reviewed.   Constitutional:       Appearance: Normal appearance. She is normal weight.   HENT:      Head: Normocephalic and atraumatic.      Nose: Nose normal.      Mouth/Throat:      Mouth: Mucous membranes are moist.      Pharynx: Oropharynx is clear.   Eyes:      Extraocular Movements: Extraocular movements intact.   Cardiovascular:      Rate and Rhythm: Normal rate and regular rhythm.      Pulses: Normal pulses.      Heart sounds: Normal heart sounds.   Pulmonary:      Effort: Pulmonary effort is normal.      Breath sounds: Normal breath " sounds.   Musculoskeletal:         General: Normal range of motion.      Cervical back: Normal range of motion.   Skin:     General: Skin is warm and dry.   Neurological:      General: No focal deficit present.      Mental Status: She is alert and oriented to person, place, and time. Mental status is at baseline.   Psychiatric:         Mood and Affect: Mood normal.     Current Outpatient Medications on File Prior to Visit   Medication Sig Dispense Refill    amitriptyline (ELAVIL) 10 MG tablet Take 1 tablet (10 mg total) by mouth every evening. 90 tablet 3    azelaic acid (AZELEX) 15 % gel Apply topically 2 (two) times a day. 50 g 3    clotrimazole-betamethasone 1-0.05% (LOTRISONE) cream Apply topically 2 (two) times daily. Apply to affected area on leg 45 g 1    DECARA 625 mcg (25,000 unit) Cap capsule Take 1 capsule (25,000 Units total) by mouth every 7 days. 12 capsule 3    dexlansoprazole (DEXILANT) 60 mg capsule TAKE 1 CAPSULE BY MOUTH EVERY DAY 30 capsule 11    diazePAM (VALIUM) 2 MG tablet Take 1 tablet (2 mg total) by mouth every 6 (six) hours as needed for Anxiety. With flying 5 tablet 0    famotidine (PEPCID) 20 MG tablet Take 2 tablets (40 mg total) by mouth every evening. 60 tablet 11    hydrOXYzine pamoate (VISTARIL) 25 MG Cap Take 1 capsule (25 mg total) by mouth every 6 (six) hours as needed (anxiety). May cause drowsiness 40 capsule 3    minoxidil, bulk, Powd Apply 5 mg topically every evening. Minoxidil 5%/ Finasteride 0.25%/ Tret 0.01% Topical Gel 25 g 6    mupirocin (BACTROBAN) 2 % ointment Apply topically 3 (three) times daily.      nitroGLYCERIN (NITROSTAT) 0.4 MG SL tablet PLEASE SEE ATTACHED FOR DETAILED DIRECTIONS      ondansetron (ZOFRAN-ODT) 4 MG TbDL Take 2 tablets (8 mg total) by mouth 2 (two) times daily. As needed for nausea/vomiting 30 tablet 0    progesterone micronized (CRINONE) 4 % Gel Apply 0.5 mLs topically every evening. 1.125 g 3    PROGESTERONE MISC   Progesterone 4% Cre, See  Instructions, apply 0.5ml to the skin EVERY NIGHT AT BEDTIME for 12 nights, # 6 mL, 11 Refill(s), [facility]acy: [facility], 162, cm, Height/Length Dosing, 10/15/21 8:07:00 CDT, 71, kg, Weight Dosing, 10/15/2...      promethazine (PHENERGAN) 12.5 MG Tab Take 1 tablet (12.5 mg total) by mouth 4 (four) times daily. As needed for nausea/vomiting. May cause drowsiness. 30 tablet 0    UNABLE TO FIND Minoxidil5%/ Finasteride0.25%/ Tret0.01% Top Gel      UNABLE TO FIND Minoxidil5%/ Finasteride0.25%/ Tret0.01% Top Gel      UNABLE TO FIND Progesterone 4% cream (laf)      zolpidem (AMBIEN) 5 MG Tab Take 1 tablet (5 mg total) by mouth nightly as needed (insomnia). 10 tablet 0     No current facility-administered medications on file prior to visit.     Health Maintenance   Topic Date Due    Hepatitis C Screening  Never done    Mammogram  01/10/2023    Lipid Panel  10/24/2027    TETANUS VACCINE  05/20/2032      Results for orders placed or performed in visit on 03/13/23   Comprehensive Metabolic Panel   Result Value Ref Range    Sodium Level 140 136 - 145 mmol/L    Potassium Level 3.9 3.5 - 5.1 mmol/L    Chloride 105 98 - 107 mmol/L    Carbon Dioxide 25 22 - 29 mmol/L    Glucose Level 94 74 - 100 mg/dL    Blood Urea Nitrogen 12.6 7.0 - 18.7 mg/dL    Creatinine 0.83 0.55 - 1.02 mg/dL    Calcium Level Total 9.0 8.4 - 10.2 mg/dL    Protein Total 8.1 6.4 - 8.3 gm/dL    Albumin Level 4.2 3.5 - 5.0 g/dL    Globulin 3.9 (H) 2.4 - 3.5 gm/dL    Albumin/Globulin Ratio 1.1 1.1 - 2.0 ratio    Bilirubin Total 0.2 <=1.5 mg/dL    Alkaline Phosphatase 71 40 - 150 unit/L    Alanine Aminotransferase 12 0 - 55 unit/L    Aspartate Aminotransferase 16 5 - 34 unit/L    eGFR >60 mls/min/1.73/m2   TSH   Result Value Ref Range    Thyroid Stimulating Hormone 1.106 0.350 - 4.940 uIU/mL   Vitamin D   Result Value Ref Range    Vit D 25 OH 69.8 30.0 - 80.0 ng/mL   Hemoglobin A1C   Result Value Ref Range    Hemoglobin A1c 4.9 <=7.0 %     Estimated Average Glucose 93.9 mg/dL   Vitamin B12   Result Value Ref Range    Vitamin B12 Level >2,000 (H) 213 - 816 pg/mL   Folate   Result Value Ref Range    Folate Level 5.2 (L) 7.0 - 31.4 ng/mL   Iron and TIBC   Result Value Ref Range    Iron Binding Capacity Unsaturated 262 70 - 310 ug/dL    Iron Level 46 (L) 50 - 170 ug/dL    Iron Binding Capacity Total 308 250 - 450 ug/dL    Iron Saturation 15 (L) 20 - 50 %   Ferritin   Result Value Ref Range    Ferritin Level 16.48 4.63 - 204.00 ng/mL   CBC with Differential   Result Value Ref Range    WBC 7.6 4.5 - 11.5 x10(3)/mcL    RBC 4.63 4.20 - 5.40 x10(6)/mcL    Hgb 13.0 12.0 - 16.0 g/dL    Hct 40.2 37.0 - 47.0 %    MCV 86.8 80.0 - 94.0 fL    MCH 28.1 pg    MCHC 32.3 (L) 33.0 - 36.0 g/dL    RDW 13.2 11.5 - 17.0 %    Platelet 226 130 - 400 x10(3)/mcL    MPV 9.9 7.4 - 10.4 fL    Neut % 67.4 %    Lymph % 22.9 %    Mono % 7.8 %    Eos % 0.9 %    Basophil % 0.7 %    Lymph # 1.73 0.6 - 4.6 x10(3)/mcL    Neut # 5.10 2.1 - 9.2 x10(3)/mcL    Mono # 0.59 0.1 - 1.3 x10(3)/mcL    Eos # 0.07 0 - 0.9 x10(3)/mcL    Baso # 0.05 0 - 0.2 x10(3)/mcL    IG# 0.02 0 - 0.04 x10(3)/mcL    IG% 0.3 %    NRBC% 0.0 %          Assessment & Plan:     Active Problem List with Overview Notes    Diagnosis Date Noted    Fatigue 03/13/2023    Influenza vaccination declined by patient 11/02/2022    Tinea cruris 11/02/2022    Unstable angina 10/13/2022    Fibromyalgia 10/11/2022    Abscess 09/13/2022    Anxiety 09/13/2022    Acne 09/13/2022    Mitral valve prolapse 09/13/2022    Disorder of sacroiliac joint 09/13/2022    Breast cancer screening by mammogram 09/13/2022    Travel advice encounter 09/07/2022    Hypovitaminosis D     OCD (obsessive compulsive disorder)     Gastroesophageal reflux disease 02/23/2017                  1. Tinea cruris  Assessment & Plan:  Skin care instructions given. Use topical cream as discussed. Concerned for yeast. No vaginal discharge.  Going on trip so declines new meds.  Advised to contact GYN for further advice.  Contact clinic for concerns.       2. Gastroesophageal reflux disease, unspecified whether esophagitis present  Overview:        Assessment & Plan:  Stable on ppi. Will go  dexilant today           Follow up for as scheduled or sooner prn.

## 2023-04-20 NOTE — ASSESSMENT & PLAN NOTE
Skin care instructions given. Use topical cream as discussed. Concerned for yeast. No vaginal discharge.  Going on trip so declines new meds. Advised to contact GYN for further advice.  Contact clinic for concerns.

## 2023-05-01 ENCOUNTER — PATIENT MESSAGE (OUTPATIENT)
Dept: ADMINISTRATIVE | Facility: HOSPITAL | Age: 43
End: 2023-05-01
Payer: COMMERCIAL

## 2023-07-10 RX ORDER — CLOTRIMAZOLE AND BETAMETHASONE DIPROPIONATE 10; .64 MG/G; MG/G
CREAM TOPICAL 2 TIMES DAILY
Qty: 45 G | Refills: 1 | Status: SHIPPED | OUTPATIENT
Start: 2023-07-10 | End: 2024-01-29 | Stop reason: SDUPTHER

## 2023-07-10 NOTE — TELEPHONE ENCOUNTER
----- Message from Laila Kwadwo sent at 7/10/2023  9:00 AM CDT -----  Regarding: refill  Type:  RX Refill Request    Who Called: pt  Refill or New Rx:refill  RX Name and Strength:clotrimazole-betamethasone 1-0.05% (LOTRISONE) cream  How is the patient currently taking it? (ex. 1XDay):  Is this a 30 day or 90 day RX:  Preferred Pharmacy with phone number: [facility]  Local or Mail Order:local  Ordering Provider:gorge flor  Would the patient rather a call back or a response via [facility]?   Best Call Back Number:[phone]  Additional Information: pt called about needing a refill on medication

## 2023-07-24 ENCOUNTER — PATIENT MESSAGE (OUTPATIENT)
Dept: ADMINISTRATIVE | Facility: HOSPITAL | Age: 43
End: 2023-07-24
Payer: COMMERCIAL

## 2023-07-27 ENCOUNTER — TELEPHONE (OUTPATIENT)
Dept: FAMILY MEDICINE | Facility: CLINIC | Age: 43
End: 2023-07-27
Payer: COMMERCIAL

## 2023-07-27 NOTE — TELEPHONE ENCOUNTER
Patient stated she has a lump under her armpit that is painful I advised her to come to her appt wednesday to be evaluated she verbalized understanding

## 2023-07-27 NOTE — TELEPHONE ENCOUNTER
----- Message from Ruth Girard sent at 7/27/2023 11:05 AM CDT -----  Regarding: Medical Advice  Type:  Needs Medical Advice    Who Called: Latricia  Symptoms (please be specific): painful lump in armpit   How long has patient had these symptoms:    Pharmacy name and phone #:    Would the patient rather a call back or a response via [facility]? Call back  Best Call Back Number: [phone]  Additional Information: Latricia is requesting a call back to discuss her pain. She has an appointment 08/02 but is requesting a call back from a nurse sooner....she has some questions

## 2023-08-29 ENCOUNTER — PATIENT MESSAGE (OUTPATIENT)
Dept: ADMINISTRATIVE | Facility: HOSPITAL | Age: 43
End: 2023-08-29
Payer: COMMERCIAL

## 2023-09-21 DIAGNOSIS — Z71.84 TRAVEL ADVICE ENCOUNTER: ICD-10-CM

## 2023-09-21 RX ORDER — ONDANSETRON 4 MG/1
8 TABLET, ORALLY DISINTEGRATING ORAL 2 TIMES DAILY
Qty: 30 TABLET | Refills: 0 | Status: SHIPPED | OUTPATIENT
Start: 2023-09-21

## 2023-09-21 NOTE — TELEPHONE ENCOUNTER
----- Message from Sindy Ca sent at 9/21/2023 10:16 AM CDT -----  Regarding: med refill  .Type:  RX Refill Request    Who Called: Pt  Refill or New Rx:Refill  RX Name and Strength:ondansetron (ZOFRAN-ODT) 4 MG TbDL  How is the patient currently taking it? (ex. 1XDay):  Is this a 30 day or 90 day RX:  Preferred Pharmacy with phone number:[facility]/pharmacy #[id number] [address]  Local or Mail Order:local  Ordering Provider:[facility]  Would the patient rather a call back or a response via [facility]? Call back  Best Call Back Number:[phone]  Additional Information:

## 2023-09-27 RX ORDER — AMITRIPTYLINE HYDROCHLORIDE 10 MG/1
10 TABLET, FILM COATED ORAL NIGHTLY
Qty: 90 TABLET | Refills: 3 | Status: SHIPPED | OUTPATIENT
Start: 2023-09-27

## 2023-10-05 RX ORDER — MINOXIDIL
POWDER (GRAM) MISCELLANEOUS
Qty: 30 G | Refills: 6 | Status: SHIPPED | OUTPATIENT
Start: 2023-10-05

## 2023-10-05 NOTE — TELEPHONE ENCOUNTER
----- Message from Samir Blanco sent at 10/5/2023  2:33 PM CDT -----  .Type:  RX Refill Request    Who Called: pt   Refill or New Rx:refill   RX Name and Strength:progesterone micronized (CRINONE) 4 % Gel  How is the patient currently taking it? (ex. 1XDay):Apply 0.5 mLs topically every evening. - Topical (Top)  Is this a 30 day or 90 day RX:  Preferred Pharmacy with phone number:[facility] [place], [place]  Local or Mail Order:local   Ordering Provider:Blessing   Would the patient rather a call back or a response via [facility]? Call back   Best Call Back Number:[phone]  Additional Information:

## 2023-10-17 ENCOUNTER — PATIENT MESSAGE (OUTPATIENT)
Dept: ADMINISTRATIVE | Facility: HOSPITAL | Age: 43
End: 2023-10-17
Payer: COMMERCIAL

## 2023-10-27 ENCOUNTER — TELEPHONE (OUTPATIENT)
Dept: FAMILY MEDICINE | Facility: CLINIC | Age: 43
End: 2023-10-27
Payer: COMMERCIAL

## 2023-10-27 NOTE — TELEPHONE ENCOUNTER
"Patient called concerned because she is having symptoms of congestion, body aches, neck pain, cough. She thought it may be covid, but stated "I'm concerned that it may be meningitis because I was kissing my hamster." I advised her that it would be best for her to go to [facility] for evaluation and to possibly be swabbed for covid and flu. She verbalized understanding   "

## 2023-12-28 RX ORDER — FAMOTIDINE 20 MG/1
40 TABLET, FILM COATED ORAL NIGHTLY
Qty: 180 TABLET | Refills: 3 | Status: SHIPPED | OUTPATIENT
Start: 2023-12-28 | End: 2024-12-22

## 2024-01-11 ENCOUNTER — TELEPHONE (OUTPATIENT)
Dept: FAMILY MEDICINE | Facility: CLINIC | Age: 44
End: 2024-01-11
Payer: COMMERCIAL

## 2024-01-11 DIAGNOSIS — F41.9 ANXIETY: ICD-10-CM

## 2024-01-11 DIAGNOSIS — Z00.00 WELLNESS EXAMINATION: ICD-10-CM

## 2024-01-11 DIAGNOSIS — I10 HYPERTENSION, UNSPECIFIED TYPE: ICD-10-CM

## 2024-01-11 DIAGNOSIS — E55.9 HYPOVITAMINOSIS D: ICD-10-CM

## 2024-01-11 DIAGNOSIS — E78.5 HYPERLIPIDEMIA, UNSPECIFIED HYPERLIPIDEMIA TYPE: ICD-10-CM

## 2024-01-11 DIAGNOSIS — K21.9 GASTROESOPHAGEAL REFLUX DISEASE, UNSPECIFIED WHETHER ESOPHAGITIS PRESENT: Primary | ICD-10-CM

## 2024-01-22 ENCOUNTER — LAB VISIT (OUTPATIENT)
Dept: LAB | Facility: HOSPITAL | Age: 44
End: 2024-01-22
Attending: FAMILY MEDICINE
Payer: COMMERCIAL

## 2024-01-22 DIAGNOSIS — K21.9 GASTROESOPHAGEAL REFLUX DISEASE, UNSPECIFIED WHETHER ESOPHAGITIS PRESENT: ICD-10-CM

## 2024-01-22 DIAGNOSIS — I10 HYPERTENSION, UNSPECIFIED TYPE: ICD-10-CM

## 2024-01-22 DIAGNOSIS — E78.5 HYPERLIPIDEMIA, UNSPECIFIED HYPERLIPIDEMIA TYPE: ICD-10-CM

## 2024-01-22 DIAGNOSIS — F41.9 ANXIETY: ICD-10-CM

## 2024-01-22 DIAGNOSIS — E55.9 HYPOVITAMINOSIS D: ICD-10-CM

## 2024-01-22 DIAGNOSIS — Z00.00 WELLNESS EXAMINATION: ICD-10-CM

## 2024-01-22 LAB
ALBUMIN SERPL-MCNC: 3.7 G/DL (ref 3.5–5)
ALBUMIN/GLOB SERPL: 1.1 RATIO (ref 1.1–2)
ALP SERPL-CCNC: 62 UNIT/L (ref 40–150)
ALT SERPL-CCNC: 14 UNIT/L (ref 0–55)
APPEARANCE UR: CLEAR
AST SERPL-CCNC: 17 UNIT/L (ref 5–34)
BASOPHILS # BLD AUTO: 0.03 X10(3)/MCL
BASOPHILS NFR BLD AUTO: 0.6 %
BILIRUB SERPL-MCNC: 0.4 MG/DL
BILIRUB UR QL STRIP.AUTO: NEGATIVE
BUN SERPL-MCNC: 11.1 MG/DL (ref 7–18.7)
CALCIUM SERPL-MCNC: 8.8 MG/DL (ref 8.4–10.2)
CHLORIDE SERPL-SCNC: 104 MMOL/L (ref 98–107)
CHOLEST SERPL-MCNC: 170 MG/DL
CHOLEST/HDLC SERPL: 3 {RATIO} (ref 0–5)
CO2 SERPL-SCNC: 28 MMOL/L (ref 22–29)
COLOR UR AUTO: NORMAL
CREAT SERPL-MCNC: 0.86 MG/DL (ref 0.55–1.02)
DEPRECATED CALCIDIOL+CALCIFEROL SERPL-MC: 56.9 NG/ML (ref 30–80)
EOSINOPHIL # BLD AUTO: 0.08 X10(3)/MCL (ref 0–0.9)
EOSINOPHIL NFR BLD AUTO: 1.5 %
ERYTHROCYTE [DISTWIDTH] IN BLOOD BY AUTOMATED COUNT: 13.2 % (ref 11.5–17)
GFR SERPLBLD CREATININE-BSD FMLA CKD-EPI: >60 MLS/MIN/1.73/M2
GLOBULIN SER-MCNC: 3.5 GM/DL (ref 2.4–3.5)
GLUCOSE SERPL-MCNC: 86 MG/DL (ref 74–100)
GLUCOSE UR QL STRIP.AUTO: NEGATIVE
HCT VFR BLD AUTO: 37.7 % (ref 37–47)
HCV AB SERPL QL IA: NONREACTIVE
HDLC SERPL-MCNC: 53 MG/DL (ref 35–60)
HGB BLD-MCNC: 12.5 G/DL (ref 12–16)
HIV 1+2 AB+HIV1 P24 AG SERPL QL IA: NONREACTIVE
IMM GRANULOCYTES # BLD AUTO: 0.01 X10(3)/MCL (ref 0–0.04)
IMM GRANULOCYTES NFR BLD AUTO: 0.2 %
KETONES UR QL STRIP.AUTO: NEGATIVE
LDLC SERPL CALC-MCNC: 102 MG/DL (ref 50–140)
LEUKOCYTE ESTERASE UR QL STRIP.AUTO: NEGATIVE
LYMPHOCYTES # BLD AUTO: 1.69 X10(3)/MCL (ref 0.6–4.6)
LYMPHOCYTES NFR BLD AUTO: 31 %
MCH RBC QN AUTO: 27.9 PG (ref 27–31)
MCHC RBC AUTO-ENTMCNC: 33.2 G/DL (ref 33–36)
MCV RBC AUTO: 84.2 FL (ref 80–94)
MONOCYTES # BLD AUTO: 0.37 X10(3)/MCL (ref 0.1–1.3)
MONOCYTES NFR BLD AUTO: 6.8 %
NEUTROPHILS # BLD AUTO: 3.27 X10(3)/MCL (ref 2.1–9.2)
NEUTROPHILS NFR BLD AUTO: 59.9 %
NITRITE UR QL STRIP.AUTO: NEGATIVE
NRBC BLD AUTO-RTO: 0 %
PH UR STRIP.AUTO: 7 [PH]
PLATELET # BLD AUTO: 216 X10(3)/MCL (ref 130–400)
PMV BLD AUTO: 9.2 FL (ref 7.4–10.4)
POTASSIUM SERPL-SCNC: 3.7 MMOL/L (ref 3.5–5.1)
PROT SERPL-MCNC: 7.2 GM/DL (ref 6.4–8.3)
PROT UR QL STRIP.AUTO: NEGATIVE
RBC # BLD AUTO: 4.48 X10(6)/MCL (ref 4.2–5.4)
RBC UR QL AUTO: NEGATIVE
SODIUM SERPL-SCNC: 138 MMOL/L (ref 136–145)
SP GR UR STRIP.AUTO: 1.01 (ref 1–1.03)
TRIGL SERPL-MCNC: 76 MG/DL (ref 37–140)
TSH SERPL-ACNC: 1.52 UIU/ML (ref 0.35–4.94)
UROBILINOGEN UR STRIP-ACNC: 0.2
VLDLC SERPL CALC-MCNC: 15 MG/DL
WBC # SPEC AUTO: 5.45 X10(3)/MCL (ref 4.5–11.5)

## 2024-01-22 PROCEDURE — 36415 COLL VENOUS BLD VENIPUNCTURE: CPT

## 2024-01-22 PROCEDURE — 86803 HEPATITIS C AB TEST: CPT

## 2024-01-22 PROCEDURE — 80053 COMPREHEN METABOLIC PANEL: CPT

## 2024-01-22 PROCEDURE — 85025 COMPLETE CBC W/AUTO DIFF WBC: CPT

## 2024-01-22 PROCEDURE — 87389 HIV-1 AG W/HIV-1&-2 AB AG IA: CPT

## 2024-01-22 PROCEDURE — 80061 LIPID PANEL: CPT

## 2024-01-22 PROCEDURE — 82306 VITAMIN D 25 HYDROXY: CPT

## 2024-01-22 PROCEDURE — 81003 URINALYSIS AUTO W/O SCOPE: CPT

## 2024-01-22 PROCEDURE — 84443 ASSAY THYROID STIM HORMONE: CPT

## 2024-01-29 ENCOUNTER — OFFICE VISIT (OUTPATIENT)
Dept: FAMILY MEDICINE | Facility: CLINIC | Age: 44
End: 2024-01-29
Payer: COMMERCIAL

## 2024-01-29 VITALS
HEART RATE: 91 BPM | BODY MASS INDEX: 27.45 KG/M2 | WEIGHT: 160.81 LBS | TEMPERATURE: 99 F | OXYGEN SATURATION: 97 % | DIASTOLIC BLOOD PRESSURE: 72 MMHG | SYSTOLIC BLOOD PRESSURE: 118 MMHG | RESPIRATION RATE: 18 BRPM | HEIGHT: 64 IN

## 2024-01-29 DIAGNOSIS — M79.7 FIBROMYALGIA: ICD-10-CM

## 2024-01-29 DIAGNOSIS — E55.9 HYPOVITAMINOSIS D: ICD-10-CM

## 2024-01-29 DIAGNOSIS — K21.00 GASTROESOPHAGEAL REFLUX DISEASE WITH ESOPHAGITIS WITHOUT HEMORRHAGE: ICD-10-CM

## 2024-01-29 DIAGNOSIS — Z12.31 BREAST CANCER SCREENING BY MAMMOGRAM: ICD-10-CM

## 2024-01-29 DIAGNOSIS — Z12.4 CERVICAL CANCER SCREENING: ICD-10-CM

## 2024-01-29 DIAGNOSIS — F41.9 ANXIETY: ICD-10-CM

## 2024-01-29 DIAGNOSIS — Z00.00 ENCOUNTER FOR PREVENTATIVE ADULT HEALTH CARE EXAMINATION: Primary | ICD-10-CM

## 2024-01-29 DIAGNOSIS — N89.8 VAGINAL PRURITUS: ICD-10-CM

## 2024-01-29 DIAGNOSIS — F42.9 OBSESSIVE-COMPULSIVE DISORDER, UNSPECIFIED TYPE: ICD-10-CM

## 2024-01-29 DIAGNOSIS — Z12.11 COLON CANCER SCREENING: ICD-10-CM

## 2024-01-29 PROCEDURE — 1160F RVW MEDS BY RX/DR IN RCRD: CPT | Mod: CPTII,,, | Performed by: FAMILY MEDICINE

## 2024-01-29 PROCEDURE — 3074F SYST BP LT 130 MM HG: CPT | Mod: CPTII,,, | Performed by: FAMILY MEDICINE

## 2024-01-29 PROCEDURE — 3078F DIAST BP <80 MM HG: CPT | Mod: CPTII,,, | Performed by: FAMILY MEDICINE

## 2024-01-29 PROCEDURE — 1159F MED LIST DOCD IN RCRD: CPT | Mod: CPTII,,, | Performed by: FAMILY MEDICINE

## 2024-01-29 PROCEDURE — 3008F BODY MASS INDEX DOCD: CPT | Mod: CPTII,,, | Performed by: FAMILY MEDICINE

## 2024-01-29 PROCEDURE — 99396 PREV VISIT EST AGE 40-64: CPT | Mod: ,,, | Performed by: FAMILY MEDICINE

## 2024-01-29 RX ORDER — CLOTRIMAZOLE AND BETAMETHASONE DIPROPIONATE 10; .64 MG/G; MG/G
CREAM TOPICAL 2 TIMES DAILY
Qty: 45 G | Refills: 1 | Status: SHIPPED | OUTPATIENT
Start: 2024-01-29

## 2024-01-29 NOTE — PROGRESS NOTES
Patient ID: [id number]     Chief Complaint: Annual Exam (Wellness with lab review)        HPI:   Disclaimer:  This note is prepared using voice recognition software and as such is likely to have errors despite attempts at proofreading. Please contact me for questions.     Latricia Collazo is a 43 y.o. female here today for an annual wellness visit.  The patient admits to a mostly unhealthy diet but states that she is working to improve this within the next couple of weeks as she has recently had a resurgence in gastroesophageal reflux disease symptoms including esophagitis.  She states she has a prescription for famotidine and is considering restarting.  She denies any vomiting or diarrhea.  She states that she is followed by a therapist for anxiety and OCD.  She states that symptoms are stable and she is typically able to use coping mechanisms.  The patient also admits to compliance with amitriptyline for fibromyalgia.  She states that it improves symptoms allows for better sleep.  The patient admits to a recurrence and vaginal pruritus.  She states symptoms initially improved while taking clotrimazole-betamethasone cream however she discontinued the medication after symptoms improved.  She admits to frequent sweating and vaginal discharge during ovulation.    Health Maintenance Due   Topic Date Due    Aspirin/Antiplatelet Therapy  Never done    Mammogram  01/10/2023    Colorectal Cancer Screening  Never done    Cervical Cancer Screening  08/31/2023        Cervical Cancer Screening - Last pap smear in 2020 NIL. Followed by Dr. Herrera, needs repeat. Referral sent.  Breast Cancer Screening - Last Mammogram 1/2022 normal. Repeat ordered today.  Colon Cancer Screening - Cologuard ordered today. Family hx of maternal grandmother at >71yo.  Osteoporosis Screening - Not yet due.  Eye Exam - Last eye exam within last 6 months per patient.  Dental Exam - Last dental exam within last 4-6 months.  Vaccinations -   Immunization  History   Administered Date(s) Administered    Hepatitis A / Hepatitis B 01/02/2003, 02/05/2003, 08/28/2003    IPV 02/05/2003    Influenza 01/02/2019    Influenza - Trivalent - PF (ADULT) 01/02/2019    MMR 02/05/2003, 06/10/2014    Meningococcal Conjugate (MCV4O) 2 Vial (2mo-55yr) 01/02/2019    Meningococcal Conjugate (MCV4P) 01/02/2019    Td (Adult), Unspecified Formulation 01/17/2003    Tdap 06/10/2014, 05/20/2022    Typhoid - ViCPs 04/01/2009, 06/10/2014    Typhus 01/02/2003    Yellow Fever 01/02/2003        Past Medical History:   Diagnosis Date    Fibromyalgia     Hypovitaminosis D     OCD (obsessive compulsive disorder)         Past Surgical History:   Procedure Laterality Date    galbladder endoscopy  2007       Review of patient's allergies indicates:   Allergen Reactions    Shellfish containing products     Amoxicillin Rash    Penicillins Rash       Outpatient Medications Marked as Taking for the 1/29/24 encounter (Office Visit) with Eder Cox MD   Medication Sig Dispense Refill    amitriptyline (ELAVIL) 10 MG tablet TAKE 1 TABLET BY MOUTH EVERY DAY IN THE EVENING 90 tablet 3    azelaic acid (AZELEX) 15 % gel Apply topically 2 (two) times a day. 50 g 3    clotrimazole-betamethasone 1-0.05% (LOTRISONE) cream Apply topically 2 (two) times daily. Apply to affected area on leg 45 g 1    diazePAM (VALIUM) 2 MG tablet Take 1 tablet (2 mg total) by mouth every 6 (six) hours as needed for Anxiety. With flying 5 tablet 0    famotidine (PEPCID) 20 MG tablet TAKE 2 TABLETS (40 MG TOTAL) BY MOUTH EVERY EVENING. 180 tablet 3    hydrOXYzine pamoate (VISTARIL) 25 MG Cap Take 1 capsule (25 mg total) by mouth every 6 (six) hours as needed (anxiety). May cause drowsiness 40 capsule 3    minoxidil, bulk, Powd APPLY TO THE AFFECTED AREA(S) TOPICALLY EVERY EVENING 30 g 6    mupirocin (BACTROBAN) 2 % ointment Apply topically 3 (three) times daily.      nitroGLYCERIN (NITROSTAT) 0.4 MG SL tablet PLEASE SEE ATTACHED FOR  DETAILED DIRECTIONS      ondansetron (ZOFRAN-ODT) 4 MG TbDL Take 2 tablets (8 mg total) by mouth 2 (two) times daily. As needed for nausea/vomiting 30 tablet 0    progesterone micronized (CRINONE) 4 % Gel Apply 0.5 mLs topically every evening. 1.125 g 3    PROGESTERONE MISC   Progesterone 4% Cre, See Instructions, apply 0.5ml to the skin EVERY NIGHT AT BEDTIME for 12 nights, # 6 mL, 11 Refill(s), [facility]acy: [facility], 162, cm, Height/Length Dosing, 10/15/21 8:07:00 CDT, 71, kg, Weight Dosing, 10/15/2...      promethazine (PHENERGAN) 12.5 MG Tab Take 1 tablet (12.5 mg total) by mouth 4 (four) times daily. As needed for nausea/vomiting. May cause drowsiness. 30 tablet 0    zolpidem (AMBIEN) 5 MG Tab Take 1 tablet (5 mg total) by mouth nightly as needed (insomnia). 10 tablet 0       Social History     Socioeconomic History    Marital status:    Tobacco Use    Smoking status: Never    Smokeless tobacco: Never   Substance and Sexual Activity    Alcohol use: Never    Drug use: Never    Sexual activity: Yes        History reviewed. No pertinent family history.     Lab Results   Component Value Date    WBC 5.45 01/22/2024    HGB 12.5 01/22/2024    HCT 37.7 01/22/2024     01/22/2024    CHOL 170 01/22/2024    TRIG 76 01/22/2024    HDL 53 01/22/2024    ALT 14 01/22/2024    AST 17 01/22/2024     01/22/2024    K 3.7 01/22/2024    CREATININE 0.86 01/22/2024    BUN 11.1 01/22/2024    CO2 28 01/22/2024    TSH 1.517 01/22/2024    INR 1.03 10/12/2022    HGBA1C 4.9 03/13/2023        Subjective:     Review of Systems:   Review of Systems   Constitutional:  Negative for chills, diaphoresis and fever.   Eyes:  Negative for blurred vision and double vision.   Respiratory:  Negative for cough and shortness of breath.    Cardiovascular:  Negative for chest pain and leg swelling.   Gastrointestinal:  Positive for heartburn. Negative for abdominal pain, diarrhea, nausea and vomiting.   Skin:   "Positive for itching. Negative for rash.   Neurological:  Negative for dizziness, tremors and headaches.   See HPI for details    Objective:     /72 (BP Location: Right arm, Patient Position: Sitting)   Pulse 91   Temp 98.9 °F (37.2 °C) (Temporal)   Resp 18   Ht 5' 4" (1.626 m)   Wt 72.9 kg (160 lb 12.8 oz)   LMP 01/08/2024   SpO2 97%   BMI 27.60 kg/m²     Physical Exam  Constitutional:       General: She is not in acute distress.     Appearance: She is not toxic-appearing or diaphoretic.   HENT:      Head: Normocephalic and atraumatic.      Right Ear: Tympanic membrane, ear canal and external ear normal. There is no impacted cerumen.      Left Ear: Tympanic membrane, ear canal and external ear normal. There is no impacted cerumen.      Nose: Nose normal.      Mouth/Throat:      Mouth: Mucous membranes are moist.      Pharynx: Oropharynx is clear. No oropharyngeal exudate or posterior oropharyngeal erythema.   Eyes:      General: No scleral icterus.     Extraocular Movements: Extraocular movements intact.      Conjunctiva/sclera: Conjunctivae normal.   Cardiovascular:      Rate and Rhythm: Normal rate and regular rhythm.      Heart sounds: Normal heart sounds. No murmur heard.     No friction rub. No gallop.   Pulmonary:      Effort: Pulmonary effort is normal. No respiratory distress.      Breath sounds: Normal breath sounds. No stridor. No wheezing, rhonchi or rales.   Musculoskeletal:         General: Normal range of motion.      Cervical back: Normal range of motion and neck supple. No rigidity.   Lymphadenopathy:      Cervical: No cervical adenopathy.   Skin:     General: Skin is warm and dry.      Coloration: Skin is not pale.   Neurological:      General: No focal deficit present.      Mental Status: She is alert and oriented to person, place, and time. Mental status is at baseline.   Psychiatric:         Mood and Affect: Mood normal.         Behavior: Behavior normal.         Thought Content: " Thought content normal.         Judgment: Judgment normal.         Assessment:       ICD-10-CM ICD-9-CM   1. Encounter for preventative adult health care examination  Z00.00 V70.0   2. Obsessive-compulsive disorder, unspecified type  F42.9 300.3   3. Anxiety  F41.9 300.00   4. Gastroesophageal reflux disease with esophagitis without hemorrhage  K21.00 530.81     530.10   5. Fibromyalgia  M79.7 729.1   6. Vaginal pruritus  N89.8 698.1   7. Colon cancer screening  Z12.11 V76.51   8. Hypovitaminosis D  E55.9 268.9   9. Breast cancer screening by mammogram  Z12.31 V76.12   10. Cervical cancer screening  Z12.4 V76.2        Plan:     Health Maintenance Topics with due status: Not Due       Topic Last Completion Date    TETANUS VACCINE 05/20/2022    Hemoglobin A1c (Diabetic Prevention Screening) 03/13/2023    Lipid Panel 01/22/2024      1. Encounter for preventative adult health care examination  Recommend annual eye exam and biannual dental exams.  Limit caffeine and alcohol intake.  Well balanced diet low in sugar/complex carbohydrates and increased vegetable intake encouraged.  Moderate intensity exercise 30 min/day at least 5 days/Wk (total 150 min/Wk) recommended.  Maintain healthy BMI which may include weight loss.  Wellness labs reviewed with patient in clinic  See above preventative health screening at today's visit.    2. Obsessive-compulsive disorder  Stable   Recommend relaxation techniques and coping strategies (i.e. essential oils, deep breathing, exercise, etc).  Seek immediate medical treatment for SOB, persistent panic attack, chest pain, suicidal thoughts or hallucinations.  Keep follow up with therapist.    3. Anxiety  See #2.    4. Gastroesophageal reflux disease with esophagitis without hemorrhage  Restart famotidine. Take at least 30 min prior to eating.  Avoid lying down after eating.  Avoid acidic or heavily seasoned foods such as tomato sauce, citrus fruits, etc.  Identify trigger foods and avoid if  possible.  Increase water intake.  Monitor for worsening symptoms and contact clinic if any concerns arise.  Consider referral to GI for EGD if symptoms persist.    5. Fibromyalgia  Stable.  Continue amitriptyline 10 mg daily.    6. Vaginal pruritus  Restart clotrimazole betamethasone cream.  Limit moisture. May use non-talcum OTC powder and skin emollients.  Avoid scratching.    7. Colon cancer screening  - Cologuard Screening (Multitarget Stool DNA); Future  - Cologuard Screening (Multitarget Stool DNA)    8. Hypovitaminosis D  Resolved  Last Vitamin D level 56.9.    9. Breast cancer screening by mammogram  - Mammo Digital Screening Bilat; Future    10. Cervical cancer screening  - Ambulatory referral/consult to Obstetrics / Gynecology; Future    Follow up in about 6 months (around 7/29/2024).

## 2024-02-05 ENCOUNTER — PATIENT MESSAGE (OUTPATIENT)
Dept: ADMINISTRATIVE | Facility: HOSPITAL | Age: 44
End: 2024-02-05
Payer: COMMERCIAL

## 2024-02-15 ENCOUNTER — TELEPHONE (OUTPATIENT)
Dept: FAMILY MEDICINE | Facility: CLINIC | Age: 44
End: 2024-02-15
Payer: COMMERCIAL

## 2024-02-15 DIAGNOSIS — K21.9 GASTROESOPHAGEAL REFLUX DISEASE, UNSPECIFIED WHETHER ESOPHAGITIS PRESENT: Primary | ICD-10-CM

## 2024-02-15 RX ORDER — DEXLANSOPRAZOLE 60 MG/1
60 CAPSULE, DELAYED RELEASE ORAL DAILY
Qty: 6 CAPSULE | Refills: 0 | Status: SHIPPED | OUTPATIENT
Start: 2024-02-15

## 2024-02-15 NOTE — TELEPHONE ENCOUNTER
----- Message from Isaiah Castillo sent at 2/15/2024  1:37 PM CST -----  .Type:  Needs Medical Advice    Who Called: Latricia  Symptoms (please be specific):    How long has patient had these symptoms:    Pharmacy name and phone #:  [facility] [address]   Would the patient rather a call back or a response via [facility]?   Best Call Back Number: [phone]  Additional Information: She would like a call back she needs six pills of Dexilant for reflux she told me she had been given this before and they are going to [place] tomorrow and needs to pick it up by tonight.

## 2024-02-26 ENCOUNTER — HOSPITAL ENCOUNTER (OUTPATIENT)
Dept: RADIOLOGY | Facility: HOSPITAL | Age: 44
Discharge: HOME | End: 2024-02-26
Attending: FAMILY MEDICINE
Payer: COMMERCIAL

## 2024-02-26 DIAGNOSIS — Z12.31 BREAST CANCER SCREENING BY MAMMOGRAM: ICD-10-CM

## 2024-02-26 PROCEDURE — 77067 SCR MAMMO BI INCL CAD: CPT | Mod: TC

## 2024-02-26 PROCEDURE — 77063 BREAST TOMOSYNTHESIS BI: CPT | Mod: 26,,, | Performed by: RADIOLOGY

## 2024-02-26 PROCEDURE — 77067 SCR MAMMO BI INCL CAD: CPT | Mod: 26,,, | Performed by: RADIOLOGY

## 2024-02-28 ENCOUNTER — TELEPHONE (OUTPATIENT)
Dept: FAMILY MEDICINE | Facility: CLINIC | Age: 44
End: 2024-02-28
Payer: COMMERCIAL

## 2024-02-28 NOTE — TELEPHONE ENCOUNTER
----- Message from Lailanico Burkett sent at 2/28/2024  2:23 PM CST -----  Regarding: returned call  Type:  Patient Returning Call    Who Called:pt    Does the patient know what this is regarding?:mammo results    Best Call Back Number:[phone]  Additional Information: stated that she received a call from the office about her mammo results. Please advise

## 2024-03-22 ENCOUNTER — PATIENT MESSAGE (OUTPATIENT)
Dept: ADMINISTRATIVE | Facility: HOSPITAL | Age: 44
End: 2024-03-22
Payer: COMMERCIAL

## 2024-04-29 ENCOUNTER — PATIENT MESSAGE (OUTPATIENT)
Dept: ADMINISTRATIVE | Facility: HOSPITAL | Age: 44
End: 2024-04-29
Payer: COMMERCIAL

## 2024-04-30 RX ORDER — CHOLECALCIFEROL (VITAMIN D3) 625 MCG
25000 CAPSULE ORAL
Qty: 12 CAPSULE | Refills: 3 | Status: SHIPPED | OUTPATIENT
Start: 2024-04-30 | End: 2025-04-30

## 2024-05-03 DIAGNOSIS — L70.9 ACNE, UNSPECIFIED ACNE TYPE: ICD-10-CM

## 2024-05-06 RX ORDER — AZELAIC ACID 0.15 G/G
GEL TOPICAL 2 TIMES DAILY
Qty: 50 G | Refills: 3 | Status: SHIPPED | OUTPATIENT
Start: 2024-05-06

## 2024-06-19 ENCOUNTER — PATIENT MESSAGE (OUTPATIENT)
Facility: CLINIC | Age: 44
End: 2024-06-19
Payer: COMMERCIAL

## 2024-06-24 NOTE — TELEPHONE ENCOUNTER
----- Message from Javi Cox sent at 6/24/2024  1:17 PM CDT -----  .Who Called: Latricia Collazo    Refill or New Rx:Refill  RX Name and Strength:progesterone micronized (CRINONE) 4 % Gel  How is the patient currently taking it? (ex. 1XDay):Sig - Route: Apply 0.5 mLs topically every evening. - Topical (Top)  Is this a 30 day or 90 day RX:  Local or Mail Order:local  List of preferred pharmacies on file (remove unneeded): [facility] [place], [place] - [address] [[id number]]  Ordering Provider: Starr Funk MD      Preferred Method of Contact: Phone Call  Patient's Preferred Phone Number on File: [phone]   Best Call Back Number, if different:  Additional Information:

## 2024-06-28 ENCOUNTER — TELEPHONE (OUTPATIENT)
Dept: FAMILY MEDICINE | Facility: CLINIC | Age: 44
End: 2024-06-28
Payer: COMMERCIAL

## 2024-06-28 NOTE — TELEPHONE ENCOUNTER
Pt called access center regarding progesterone cream. Pt disconnected the phone call before she could be transferred to the office. Call placed to Dr. Herrera office. Left a vm requesting a call back.

## 2024-07-03 ENCOUNTER — TELEPHONE (OUTPATIENT)
Dept: FAMILY MEDICINE | Facility: CLINIC | Age: 44
End: 2024-07-03
Payer: COMMERCIAL

## 2024-07-03 NOTE — TELEPHONE ENCOUNTER
"Patient returned call. She said Dr. Herrera used to prescribe, but she said that Dr. Funk has been prescribing "for the past number of years". Please advise, thank you   "

## 2024-07-03 NOTE — TELEPHONE ENCOUNTER
I called the pharmacy. Issue resolved. Correct medication order paced via verbal. Medication will be delivered as usual

## 2024-07-03 NOTE — TELEPHONE ENCOUNTER
----- Message from Sindy Ca sent at 7/3/2024  8:36 AM CDT -----  Regarding: med refill  .Who Called: Latricia Collazo    Refill or New Rx:Refill  RX Name and Strength:progesterone micronized (CRINONE) 4 % Gel   How is the patient currently taking it? (ex. 1XDay):  Is this a 30 day or 90 day RX:  Local or Mail Order:Local  List of preferred pharmacies on file (remove unneeded): [facility]  If different Pharmacy is requested, enter Pharmacy information here including location and phone number: [facility]    Ordering provider:[facility]  Preferred Method of Contact: Phone Call  Patient's Preferred Phone Number on File: [phone]   Best Call Back Number, if different:  Additional Information:

## 2024-07-03 NOTE — TELEPHONE ENCOUNTER
Spoke with pharmacy. Notified that rx is not compound and they requested we send it to another pharmacy. Called patient to ask which pharmacy she preferred since she has 3 in chart. No answer I LVM for return call

## 2024-07-03 NOTE — TELEPHONE ENCOUNTER
Spoke with ashu at [facility] medication comes in single use applicators so she requested verbal to update directions to read that the patient should use one applicator per dose. Verbal given

## 2024-07-03 NOTE — TELEPHONE ENCOUNTER
----- Message from Clovis Feliz sent at 7/3/2024  1:57 PM CDT -----  Type:  Pharmacy Calling to Clarify an RX    Name of Caller:[facility]  [facility]armacy [facility]   Prescription Name:progesterone micronized (CRINONE) 4 % Gel  What do they need to clarify?:  Best Call Back Number:[phone]  Additional Information: unable to fill because its not a compund , call pharmacy with further questions

## 2024-07-03 NOTE — TELEPHONE ENCOUNTER
----- Message from Ginger Kate sent at 7/3/2024 12:28 PM CDT -----  .Type:  Patient Returning Call    Who Called:Ana Blackmon  Who Left Message for Patient:Ana  Does the patient know what this is regarding?:progesterone micronized (CRINONE) 4 % Gel  Would the patient rather a call back or a response via [facility]?   Best Call Back Number:[phone]  Additional Information: Please call back about clarification on progesterone micronized (CRINONE) 4 % Gel

## 2024-07-03 NOTE — TELEPHONE ENCOUNTER
----- Message from Samir Blanco sent at 7/3/2024  2:48 PM CDT -----  .Type:  Patient Returning Call    Who Called:pt   Who Left Message for Patient:  Does the patient know what this is regarding?:States that she needs a compound of the medication PROGESTERONE MISC cream which is a compound./ not the one that was called in   Would the patient rather a call back or a response via [facility]? Call back   Best Call Back Number:[phone]  Additional Information: Called the back line no answer

## 2024-07-10 ENCOUNTER — PATIENT OUTREACH (OUTPATIENT)
Facility: CLINIC | Age: 44
End: 2024-07-10
Payer: COMMERCIAL

## 2024-07-10 NOTE — LETTER
Dear [facility] is committed to your overall health. Periodically we review the health information in your chart to make sure you are up to date on all of your recommended tests and/or procedures.       Our review of your chart shows that you may be due for     Health Maintenance Due   Topic    Colorectal Cancer Screening      After reviewing your chart, it has been documented that a Cologuard Kit (Colorectal Cancer Screening) was ordered by your provider as a screening test for colorectal cancer and mailed to your home, but the lab has not received the completed kit.       This is a friendly reminder to collect your sample and mail it back to [facility] as soon as possible.     Here are some tips from [facility]:     If you misplaced this kit or never received one, call [employer] Patient Help Line at [phone]. Option 1.     Plan to collect your sample when you can get it back to [facility] that same day or the next day.     Choose the no-cost return option that works best for you:      -Ask for a contact-free UPS pick-up. Call us at [phone] for help or visit [facility] to schedule it on your own.  or  - Drop it off at [facility]. Visit [facility] to see your local options and hours. Remember, some places are closed on Sundays or holidays.     Wait to hear from your health care provider. [facility] will send the result of your test to your health care provider in a few weeks. Your provider will then share the result with you.     If you would like to watch a quick video to review the collection process you can go to [email or web address]     Or scan this QR code       If you need a new kit or have questions, please contact [facility] at [phone]. Option 1.     Sincerely,  Maryann, Care Coordinator, [facility]   Your [facility] Team.

## 2024-07-10 NOTE — PROGRESS NOTES
Health Maintenance Topic(s) Outreach Outcomes & Actions Taken:    Colorectal Cancer Screening - Outreach Outcomes & Actions Taken  : Reminded Patient to Complete Already Received Home Test and Letter mailed.    Cervical Cancer Screening - Outreach Outcomes & Actions Taken  : Gyn referral. Unable to contact. Letter mailed.       Additional Notes:  Population health chart review for health maintenance.   Next PCP F/U: 7/30/2024  SDOH Incomplete    Health Maintenance Topics Overdue:  Cervical Cancer Screening- referral to Dr EMELY Herrera 1/29/2024. Unable to contact. Letter mailed.  Colon Cancer Screening- Cologuard ordered 1/29/2024. Not returned. Letter mailed.         Care Management, Digital Medicine, and/or Education Referrals      Next Steps - Referral Actions: Digital Medicine Outcomes and Actions Taken: Pt Declined or Not Eligible

## 2024-07-10 NOTE — LETTER
[facility] is committed to your overall health. Periodically we review the health information in your chart to make sure you are up to date on all of your recommended tests and/or procedures.       Our review of your chart shows that you may be due for     Health Maintenance Due   Topic    Cervical Cancer Screening      Dr Mateus Herrera's office has been trying to contact you. Please call [phone] to schedule your appointment. If you have been scheduled, please disregard this notice.    If you have had any of the above done at another facility, please let us know and we will request a copy of the report to update [facility] record.     If there is anything else we can do to help you. Please message via your patient portal or give me a call at [phone].      Sincerely,    JULES Wolf Care Coordinator  Starr Funk MD and your [facility]

## 2024-07-17 ENCOUNTER — PATIENT MESSAGE (OUTPATIENT)
Facility: CLINIC | Age: 44
End: 2024-07-17
Payer: COMMERCIAL

## 2024-08-19 ENCOUNTER — TELEPHONE (OUTPATIENT)
Dept: FAMILY MEDICINE | Facility: CLINIC | Age: 44
End: 2024-08-19
Payer: COMMERCIAL

## 2024-08-19 NOTE — TELEPHONE ENCOUNTER
Patient notified of wellness in January with Dr. Cox. She said she will wait to schedule annual until closer to.

## 2024-08-19 NOTE — TELEPHONE ENCOUNTER
----- Message from Ariadna Figueroa sent at 8/19/2024 11:29 AM CDT -----  Who Called: Latricia Collazo    Caller is requesting assistance/information from provider's office.    Symptoms (please be specific):    How long has patient had these symptoms:    List of preferred pharmacies on file (remove unneeded): [unfilled]  If different, enter pharmacy into here including location and phone number:       Preferred Method of Contact: Phone Call  Patient's Preferred Phone Number on File: [phone]  Best Call Back Number, if different:  Additional Information: Pt is requesting a call in regards to medication and wellness. #pt is aware it is time for wellness, but would like a call before scheduling

## 2024-08-29 ENCOUNTER — TELEPHONE (OUTPATIENT)
Dept: FAMILY MEDICINE | Facility: CLINIC | Age: 44
End: 2024-08-29
Payer: COMMERCIAL

## 2024-08-29 DIAGNOSIS — Z71.84 TRAVEL ADVICE ENCOUNTER: ICD-10-CM

## 2024-08-29 RX ORDER — ONDANSETRON 4 MG/1
8 TABLET, ORALLY DISINTEGRATING ORAL 2 TIMES DAILY
Qty: 30 TABLET | Refills: 0 | Status: SHIPPED | OUTPATIENT
Start: 2024-08-29

## 2024-08-29 NOTE — TELEPHONE ENCOUNTER
----- Message from Clovis Feliz sent at 8/29/2024  2:26 PM CDT -----  Who Called: Latricia Vale    Caller is requesting assistance/information from provider's office.    Symptoms (please be specific):    How long has patient had these symptoms:    List of preferred pharmacies on file (remove unneeded): [unfilled]  If different, enter pharmacy into here including location and phone number: [id number] [place] [facility] [address] [facility] [address]        Patient's Preferred Phone Number on File: [phone]  Best Call Back Number, if different:  Additional Information: [place] would like to req Zofran is sent to [facility] follow up

## 2024-08-29 NOTE — TELEPHONE ENCOUNTER
I have signed for the following orders AND/OR meds.  Please call the patient and ask the patient to schedule the testing AND/OR inform about any medications that were sent.        Medications Ordered This Encounter   Medications    ondansetron (ZOFRAN-ODT) 4 MG TbDL     Sig: Take 2 tablets (8 mg total) by mouth 2 (two) times daily. As needed for nausea/vomiting     Dispense:  30 tablet     Refill:  0      Topical Ketoconazole Pregnancy And Lactation Text: This medication is Pregnancy Category B and is considered safe during pregnancy. It is unknown if it is excreted in breast milk.

## 2024-09-26 RX ORDER — AMITRIPTYLINE HYDROCHLORIDE 10 MG/1
10 TABLET, FILM COATED ORAL NIGHTLY
Qty: 90 TABLET | Refills: 0 | Status: SHIPPED | OUTPATIENT
Start: 2024-09-26

## 2024-10-29 ENCOUNTER — PATIENT MESSAGE (OUTPATIENT)
Facility: CLINIC | Age: 44
End: 2024-10-29
Payer: COMMERCIAL

## 2025-01-03 RX ORDER — AMITRIPTYLINE HYDROCHLORIDE 10 MG/1
10 TABLET, FILM COATED ORAL NIGHTLY
Qty: 90 TABLET | Refills: 0 | Status: SHIPPED | OUTPATIENT
Start: 2025-01-03

## 2025-01-03 RX ORDER — CLOTRIMAZOLE AND BETAMETHASONE DIPROPIONATE 10; .64 MG/G; MG/G
CREAM TOPICAL 2 TIMES DAILY
Qty: 45 G | Refills: 1 | Status: SHIPPED | OUTPATIENT
Start: 2025-01-03

## 2025-01-03 NOTE — TELEPHONE ENCOUNTER
----- Message from Steph sent at 1/3/2025 10:43 AM CST -----  Who Called: Latricia Collazo    Refill or New Rx:Refill  RX Name and Strength:amitriptyline (ELAVIL) 10 MG tablet  How is the patient currently taking it? (ex. 1XDay):TAKE 1 TABLET BY MOUTH EVERY DAY IN THE EVENING - Oral  Is this a 30 day or 90 day RX:90  Local or Mail Order:local   List of preferred pharmacies on file (remove unneeded): [facility]/PHARMACY #[id number] - [place], [place]  [facility]      Ordering Provider:Monet Hamm NP      Preferred Method of Contact: Phone Call  Patient's Preferred Phone Number on File: [phone]   Best Call Back Number, if different:  Additional Information:     Who Called: Latricia Collazo    Refill or New Rx:Refill  RX Name and Strength:clotrimazole-betamethasone 1-0.05% (LOTRISONE) cream  How is the patient currently taking it? (ex. 1XDay):Apply topically 2 (two) times daily. Apply to affected area on leg - Topical (Top)  Is this a 30 day or 90 day RX:n/a  Local or Mail Order:local   List of preferred pharmacies on file (remove unneeded): [facility]ARMACY #[id number] - [place], [place] - [address]    Ordering Provider:Eder Cox MD      Preferred Method of Contact: Phone Call  Patient's Preferred Phone Number on File: [phone]   Best Call Back Number, if different:  Additional Information:

## 2025-03-31 RX ORDER — AMITRIPTYLINE HYDROCHLORIDE 10 MG/1
10 TABLET, FILM COATED ORAL NIGHTLY
Qty: 90 TABLET | Refills: 0 | Status: SHIPPED | OUTPATIENT
Start: 2025-03-31 | End: 2025-03-31 | Stop reason: SDUPTHER

## 2025-03-31 RX ORDER — CHOLECALCIFEROL (VITAMIN D3) 625 MCG
25000 CAPSULE ORAL
Qty: 12 CAPSULE | Refills: 3 | Status: CANCELLED | OUTPATIENT
Start: 2025-03-31 | End: 2026-03-31

## 2025-03-31 RX ORDER — AMITRIPTYLINE HYDROCHLORIDE 10 MG/1
10 TABLET, FILM COATED ORAL NIGHTLY
Qty: 90 TABLET | Refills: 0 | Status: SHIPPED | OUTPATIENT
Start: 2025-03-31

## 2025-03-31 RX ORDER — CLOTRIMAZOLE AND BETAMETHASONE DIPROPIONATE 10; .64 MG/G; MG/G
CREAM TOPICAL 2 TIMES DAILY
Qty: 45 G | Refills: 1 | Status: SHIPPED | OUTPATIENT
Start: 2025-03-31

## 2025-03-31 NOTE — TELEPHONE ENCOUNTER
----- Message from Celeste Munguia sent at 3/31/2025  9:36 AM CDT -----  Who Called: Latricia Painterill or New Rx:RefillRX Name and Strength:amitriptyline (ELAVIL) 10 MG tabletHow is the patient currently taking it? (ex. 1XDay):1/nightlyIs this a 30 day or 90 day RX:90 dayLocal or Mail Order:LocalList of preferred pharmacies on file (remove unneeded): [facility] [address] [place] [address] Provider:blessingRefill or New Rx:RefillRX Name and Strength:clotrimazole-betamethasoneHow is the patient currently taking it? (ex. 1XDay):N/AIs this a 30 day or 90 day RX:N/ALocal or Mail Order:LocalList of preferred pharmacies on file (remove unneeded): [facility] [address] Provider:BlessingRefill or New Rx:RefillRX Name and Strength:DECARA How is the patient currently taking it? (ex. 1XDay):N/AIs this a 30 day or 90 day RX:N/ALocal or Mail Order:LocalList of preferred pharmacies on file (remove unneeded): [facility] [address] [place] [address] Provider:Sheela Method of Contact: Phone CallPatient's Preferred Phone Number on File: [phone] Best Call Back Number, if different:Additional Information:

## 2025-04-14 ENCOUNTER — DOCUMENTATION ONLY (OUTPATIENT)
Facility: CLINIC | Age: 45
End: 2025-04-14
Payer: COMMERCIAL

## 2025-04-22 ENCOUNTER — TELEPHONE (OUTPATIENT)
Dept: FAMILY MEDICINE | Facility: CLINIC | Age: 45
End: 2025-04-22
Payer: COMMERCIAL

## 2025-04-22 DIAGNOSIS — M79.7 FIBROMYALGIA: ICD-10-CM

## 2025-04-22 DIAGNOSIS — E55.9 HYPOVITAMINOSIS D: ICD-10-CM

## 2025-04-22 DIAGNOSIS — Z00.00 WELLNESS EXAMINATION: Primary | ICD-10-CM

## 2025-04-22 NOTE — TELEPHONE ENCOUNTER
Copied from CRM #[id number]. Topic: General Inquiry - Information Request  >> Apr 22, 2025 10:41 AM Clovis wrote:  Who Called: Latricia Collazo    Caller is requesting assistance/information from provider's office.    Symptoms (please be specific):    How long has patient had these symptoms:    List of preferred pharmacies on file (remove unneeded): [unfilled]  If different, enter pharmacy into here including location and phone number:     Patient's Preferred Phone Number on File: [phone]   Best Call Back Number, if different:    Additional Information: please order pt wellness labs prior to upcoming appt 04/24 (pt is req additional labs are ordered:( DHEAS,  estradol, LH), pt would also like to determine if she can complete pap smear at the time of the appt  , please follow up

## 2025-04-22 NOTE — TELEPHONE ENCOUNTER
----- Message from Savanah sent at 4/22/2025  3:24 PM CDT -----  Regarding: lab  .Caller is requesting to schedule their Lab appointment prior to annual appointment.Name of Caller:ptPreferred Date and Time of Labs:Date of EPP Appointment:4/24Where would they like the lab performed?n/aWould the patient rather a call back or a response via My [facility]? Be Call Back Number: [phone] Information:advise when added

## 2025-04-22 NOTE — TELEPHONE ENCOUNTER
Yes we can do pap at visit      I have signed for the following orders AND/OR meds.  Please call the patient and ask the patient to schedule the testing AND/OR inform about any medications that were sent.      Orders Placed This Encounter   Procedures    CBC Auto Differential     Standing Status:   Future     Expected Date:   4/22/2025     Expiration Date:   7/21/2026    Comprehensive Metabolic Panel     Standing Status:   Future     Expected Date:   4/22/2025     Expiration Date:   7/21/2026    Hemoglobin A1C     Standing Status:   Future     Expected Date:   4/22/2025     Expiration Date:   7/21/2026    Lipid Panel     Standing Status:   Future     Expected Date:   4/22/2025     Expiration Date:   7/21/2026    TSH     Standing Status:   Future     Expected Date:   4/22/2025     Expiration Date:   7/21/2026    Vitamin D     Standing Status:   Future     Expected Date:   4/22/2025     Expiration Date:   7/21/2026    Urinalysis, Reflex to Urine Culture     Standing Status:   Future     Expected Date:   4/22/2025     Expiration Date:   6/21/2026     Specimen Source:   Urine    DHEA-Sulfate     Standing Status:   Future     Expected Date:   4/22/2025     Expiration Date:   7/21/2026     Send normal result to authorizing provider's In Basket if patient is active on MyChart::   Yes    Luteinizing Hormone     Standing Status:   Future     Expected Date:   4/22/2025     Expiration Date:   6/21/2026    Estradiol     Standing Status:   Future     Expected Date:   4/22/2025     Expiration Date:   6/21/2026

## 2025-04-22 NOTE — TELEPHONE ENCOUNTER
Proposed regular wellness labs but patient wants to add more per message. Notified can complete pap if needed at time of visit

## 2025-04-23 ENCOUNTER — TELEPHONE (OUTPATIENT)
Dept: FAMILY MEDICINE | Facility: CLINIC | Age: 45
End: 2025-04-23
Payer: COMMERCIAL

## 2025-04-23 ENCOUNTER — RESULTS FOLLOW-UP (OUTPATIENT)
Dept: FAMILY MEDICINE | Facility: CLINIC | Age: 45
End: 2025-04-23

## 2025-04-23 ENCOUNTER — LAB VISIT (OUTPATIENT)
Dept: LAB | Facility: HOSPITAL | Age: 45
End: 2025-04-23
Attending: FAMILY MEDICINE
Payer: COMMERCIAL

## 2025-04-23 DIAGNOSIS — Z00.00 WELLNESS EXAMINATION: ICD-10-CM

## 2025-04-23 DIAGNOSIS — E55.9 HYPOVITAMINOSIS D: ICD-10-CM

## 2025-04-23 DIAGNOSIS — M79.7 FIBROMYALGIA: ICD-10-CM

## 2025-04-23 LAB
25(OH)D3+25(OH)D2 SERPL-MCNC: 66 NG/ML (ref 30–80)
ALBUMIN SERPL-MCNC: 3.7 G/DL (ref 3.5–5)
ALBUMIN/GLOB SERPL: 1.2 RATIO (ref 1.1–2)
ALP SERPL-CCNC: 51 UNIT/L (ref 40–150)
ALT SERPL-CCNC: 12 UNIT/L (ref 0–55)
ANION GAP SERPL CALC-SCNC: 7 MEQ/L
AST SERPL-CCNC: 16 UNIT/L (ref 11–45)
BACTERIA #/AREA URNS AUTO: ABNORMAL /HPF
BASOPHILS # BLD AUTO: 0.03 X10(3)/MCL
BASOPHILS NFR BLD AUTO: 0.5 %
BILIRUB SERPL-MCNC: 0.4 MG/DL
BILIRUB UR QL STRIP.AUTO: NEGATIVE
BUN SERPL-MCNC: 8.6 MG/DL (ref 7–18.7)
CALCIUM SERPL-MCNC: 8.5 MG/DL (ref 8.4–10.2)
CHLORIDE SERPL-SCNC: 110 MMOL/L (ref 98–107)
CHOLEST SERPL-MCNC: 175 MG/DL
CHOLEST/HDLC SERPL: 3 {RATIO} (ref 0–5)
CLARITY UR: ABNORMAL
CO2 SERPL-SCNC: 24 MMOL/L (ref 22–29)
COLOR UR AUTO: YELLOW
CREAT SERPL-MCNC: 0.83 MG/DL (ref 0.55–1.02)
CREAT/UREA NIT SERPL: 10
EOSINOPHIL # BLD AUTO: 0.09 X10(3)/MCL (ref 0–0.9)
EOSINOPHIL NFR BLD AUTO: 1.5 %
ERYTHROCYTE [DISTWIDTH] IN BLOOD BY AUTOMATED COUNT: 14.1 % (ref 11.5–17)
EST. AVERAGE GLUCOSE BLD GHB EST-MCNC: 99.7 MG/DL
ESTRADIOL SERPL HS-MCNC: 56 PG/ML
GFR SERPLBLD CREATININE-BSD FMLA CKD-EPI: >60 ML/MIN/1.73/M2
GLOBULIN SER-MCNC: 3.2 GM/DL (ref 2.4–3.5)
GLUCOSE SERPL-MCNC: 89 MG/DL (ref 74–100)
GLUCOSE UR QL STRIP: NEGATIVE
HBA1C MFR BLD: 5.1 %
HCT VFR BLD AUTO: 34.9 % (ref 37–47)
HDLC SERPL-MCNC: 64 MG/DL (ref 35–60)
HGB BLD-MCNC: 11.7 G/DL (ref 12–16)
HGB UR QL STRIP: ABNORMAL
IMM GRANULOCYTES # BLD AUTO: 0.02 X10(3)/MCL (ref 0–0.04)
IMM GRANULOCYTES NFR BLD AUTO: 0.3 %
KETONES UR QL STRIP: NEGATIVE
LDLC SERPL CALC-MCNC: 94 MG/DL (ref 50–140)
LEUKOCYTE ESTERASE UR QL STRIP: NEGATIVE
LH SERPL-ACNC: 6.42 MIU/ML
LYMPHOCYTES # BLD AUTO: 1.6 X10(3)/MCL (ref 0.6–4.6)
LYMPHOCYTES NFR BLD AUTO: 27.5 %
MCH RBC QN AUTO: 27.6 PG (ref 27–31)
MCHC RBC AUTO-ENTMCNC: 33.5 G/DL (ref 33–36)
MCV RBC AUTO: 82.3 FL (ref 80–94)
MONOCYTES # BLD AUTO: 0.4 X10(3)/MCL (ref 0.1–1.3)
MONOCYTES NFR BLD AUTO: 6.9 %
NEUTROPHILS # BLD AUTO: 3.67 X10(3)/MCL (ref 2.1–9.2)
NEUTROPHILS NFR BLD AUTO: 63.3 %
NITRITE UR QL STRIP: NEGATIVE
NRBC BLD AUTO-RTO: 0 %
PH UR STRIP: 6 [PH]
PLATELET # BLD AUTO: 185 X10(3)/MCL (ref 130–400)
PMV BLD AUTO: 9 FL (ref 7.4–10.4)
POTASSIUM SERPL-SCNC: 3.6 MMOL/L (ref 3.5–5.1)
PROT SERPL-MCNC: 6.9 GM/DL (ref 6.4–8.3)
PROT UR QL STRIP: NEGATIVE
RBC # BLD AUTO: 4.24 X10(6)/MCL (ref 4.2–5.4)
RBC #/AREA URNS AUTO: ABNORMAL /HPF
SODIUM SERPL-SCNC: 141 MMOL/L (ref 136–145)
SP GR UR STRIP.AUTO: 1.02 (ref 1–1.03)
SQUAMOUS #/AREA URNS AUTO: ABNORMAL /HPF
TRIGL SERPL-MCNC: 84 MG/DL (ref 37–140)
TSH SERPL-ACNC: 1.94 UIU/ML (ref 0.35–4.94)
UROBILINOGEN UR STRIP-ACNC: 0.2
VLDLC SERPL CALC-MCNC: 17 MG/DL
WBC # BLD AUTO: 5.81 X10(3)/MCL (ref 4.5–11.5)
WBC #/AREA URNS AUTO: ABNORMAL /HPF

## 2025-04-23 PROCEDURE — 83036 HEMOGLOBIN GLYCOSYLATED A1C: CPT

## 2025-04-23 PROCEDURE — 82627 DEHYDROEPIANDROSTERONE: CPT

## 2025-04-23 PROCEDURE — 80053 COMPREHEN METABOLIC PANEL: CPT

## 2025-04-23 PROCEDURE — 80061 LIPID PANEL: CPT

## 2025-04-23 PROCEDURE — 36415 COLL VENOUS BLD VENIPUNCTURE: CPT

## 2025-04-23 PROCEDURE — 84443 ASSAY THYROID STIM HORMONE: CPT

## 2025-04-23 PROCEDURE — 85025 COMPLETE CBC W/AUTO DIFF WBC: CPT

## 2025-04-23 PROCEDURE — 83002 ASSAY OF GONADOTROPIN (LH): CPT

## 2025-04-23 PROCEDURE — 82306 VITAMIN D 25 HYDROXY: CPT

## 2025-04-23 PROCEDURE — 82670 ASSAY OF TOTAL ESTRADIOL: CPT

## 2025-04-23 PROCEDURE — 81003 URINALYSIS AUTO W/O SCOPE: CPT

## 2025-04-23 NOTE — TELEPHONE ENCOUNTER
Copied from CRM #[id number]. Topic: General Inquiry - Return Call  >> Apr 23, 2025  7:56 AM Clovis wrote:  Who Called: Latricia Collazo    Patient is returning phone call    Who Left Message for Patient:Elaina   Does the patient know what this is regarding?:orders      Preferred Method of Contact: Phone Call  Patient's Preferred Phone Number on File: [phone]   Best Call Back Number, if different:  Additional Information:

## 2025-04-24 ENCOUNTER — OFFICE VISIT (OUTPATIENT)
Dept: FAMILY MEDICINE | Facility: CLINIC | Age: 45
End: 2025-04-24
Payer: COMMERCIAL

## 2025-04-24 VITALS
HEIGHT: 64 IN | TEMPERATURE: 98 F | RESPIRATION RATE: 16 BRPM | BODY MASS INDEX: 25.47 KG/M2 | SYSTOLIC BLOOD PRESSURE: 110 MMHG | OXYGEN SATURATION: 99 % | WEIGHT: 149.19 LBS | DIASTOLIC BLOOD PRESSURE: 62 MMHG | HEART RATE: 61 BPM

## 2025-04-24 DIAGNOSIS — Z12.31 BREAST CANCER SCREENING BY MAMMOGRAM: ICD-10-CM

## 2025-04-24 DIAGNOSIS — K21.9 GASTROESOPHAGEAL REFLUX DISEASE, UNSPECIFIED WHETHER ESOPHAGITIS PRESENT: ICD-10-CM

## 2025-04-24 DIAGNOSIS — Z00.00 WELLNESS EXAMINATION: Primary | ICD-10-CM

## 2025-04-24 DIAGNOSIS — E55.9 HYPOVITAMINOSIS D: ICD-10-CM

## 2025-04-24 DIAGNOSIS — M79.7 FIBROMYALGIA: ICD-10-CM

## 2025-04-24 DIAGNOSIS — Z12.4 PAP SMEAR FOR CERVICAL CANCER SCREENING: ICD-10-CM

## 2025-04-24 DIAGNOSIS — I20.0 UNSTABLE ANGINA: Chronic | ICD-10-CM

## 2025-04-24 DIAGNOSIS — Z71.84 TRAVEL ADVICE ENCOUNTER: ICD-10-CM

## 2025-04-24 DIAGNOSIS — M53.3 DISORDER OF SACROILIAC JOINT: ICD-10-CM

## 2025-04-24 DIAGNOSIS — Z12.11 COLON CANCER SCREENING: ICD-10-CM

## 2025-04-24 DIAGNOSIS — L70.9 ACNE, UNSPECIFIED ACNE TYPE: ICD-10-CM

## 2025-04-24 PROBLEM — L02.91 ABSCESS: Status: RESOLVED | Noted: 2022-09-13 | Resolved: 2025-04-24

## 2025-04-24 LAB — DHEA-S SERPL-MCNC: 49 MCG/DL (ref 27–240)

## 2025-04-24 PROCEDURE — 3074F SYST BP LT 130 MM HG: CPT | Mod: CPTII,,, | Performed by: FAMILY MEDICINE

## 2025-04-24 PROCEDURE — 3044F HG A1C LEVEL LT 7.0%: CPT | Mod: CPTII,,, | Performed by: FAMILY MEDICINE

## 2025-04-24 PROCEDURE — 3078F DIAST BP <80 MM HG: CPT | Mod: CPTII,,, | Performed by: FAMILY MEDICINE

## 2025-04-24 PROCEDURE — 1159F MED LIST DOCD IN RCRD: CPT | Mod: CPTII,,, | Performed by: FAMILY MEDICINE

## 2025-04-24 PROCEDURE — 99396 PREV VISIT EST AGE 40-64: CPT | Mod: ,,, | Performed by: FAMILY MEDICINE

## 2025-04-24 PROCEDURE — 1160F RVW MEDS BY RX/DR IN RCRD: CPT | Mod: CPTII,,, | Performed by: FAMILY MEDICINE

## 2025-04-24 PROCEDURE — 3008F BODY MASS INDEX DOCD: CPT | Mod: CPTII,,, | Performed by: FAMILY MEDICINE

## 2025-04-24 RX ORDER — NITROGLYCERIN 0.4 MG/1
TABLET SUBLINGUAL
Qty: 25 TABLET | Refills: 0 | Status: SHIPPED | OUTPATIENT
Start: 2025-04-24 | End: 2025-04-24

## 2025-04-24 RX ORDER — ONDANSETRON 4 MG/1
8 TABLET, ORALLY DISINTEGRATING ORAL 2 TIMES DAILY
Qty: 30 TABLET | Refills: 0 | Status: SHIPPED | OUTPATIENT
Start: 2025-04-24

## 2025-04-24 RX ORDER — NITROGLYCERIN 0.4 MG/1
TABLET SUBLINGUAL
Qty: 25 TABLET | Refills: 0 | Status: SHIPPED | OUTPATIENT
Start: 2025-04-24

## 2025-04-24 NOTE — ASSESSMENT & PLAN NOTE
Prescription for zofran sent as requested.  Reminded patient to use sparingly and only prn. Patient is agreeable to plan and verbalized understanding

## 2025-04-24 NOTE — ASSESSMENT & PLAN NOTE
Previously done labs reviewed with patient at time of appointment today  Mammogram 2/2024. ordered  Pap smear today with assistance from nurse  Cologuard ordered    Declines immunizations today

## 2025-04-24 NOTE — PROGRESS NOTES
Subjective:        Patient ID: Latricia Collazo is a 45 y.o. female.    Chief Complaint: Annual Exam (Wellness and pap smear )      presents to clinic unaccompanied for her wellness visit.  She did labs prior to her appt and it was reviewed with her at time of appt today. She was on cycle at time of labs.       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  Have not decided.  Asking for zofran rx to take with her on flight. Usually will take ambien on flight to help minimize jet lag.not asking for refill at this time. Also has valium to take for anxiety/insomnia related to flying.      She has acid reflux. Her GI is Dr. Whitlock. doing well on pepcid 40mg qhs and dexilant. has had egd about 2 year ago. She has had a Stretta procedure in the past.  she was referred to psych but appt got cancelled before she was able to be seen due to COVID-19. she was able to get set up with a counselor instead- Ms. Sánchez- whom she video calls with. she diagnosed her with OCD. States has had since she was younger.  not currently recommending medications. Has been on vistaril in the past     she has fibromyalgia and takes amitriptyline at bedtime. she also complains of SI joint dysfunction. she has seen [facility] in the past for this. has done pelvic floor PT as well.  [facility] has also seen a neuro, dr. Del Toro.  They ruled out MS.  Cynthia her with fibro.  Reports pain cycles. No numbness. Mom has charcot david tooth.       she has low vit d. she supplements- on prescription (25,000IU weekly)     she has acne and in on finacea gel and aczone topically.  She sees derm at dr. cardoza     she reports  MVP. she sees dr. odell q6 months. she went to ER for episode of chest tightness 1/25/21 at [facility] and everything was fine. Has another episode 10/12/22.  Went to ER.  Will epigastric chest pain/lightheadedness.  Will have bilateral arm paresthesia. She was tachycardic.  Will resolve on its own.  Feels sore next day.  Has  "seen dr. Deluca.  She has had stress test and holter. She is now seeing dr. Christopher.  Thinks may be esophageal spasm causing symptoms. Asking for refill of her ntg.  Is . Uses rarely.      her gyn is dr. ann. she is on progesterone cream. She would like to do pap today.  No problems or concerns. Mmg 2022.  Ordered.  Cologuard ordered.      She is ALLERGIC to amoxicillin and shellfish. She does not drink alcohol or smoke        Review of Systems   Constitutional: Negative.    HENT: Negative.     Respiratory: Negative.     Cardiovascular: Negative.    Gastrointestinal: Negative.    Genitourinary: Negative.          Review of patient's allergies indicates:   Allergen Reactions    Shellfish containing products     Trazodone      Other Reaction(s): Other    Amoxicillin Rash    Levofloxacin Rash    Penicillins Rash      Vitals:    25 1444   BP: 110/62   BP Location: Left arm   Patient Position: Sitting   Pulse: 61   Resp: 16   Temp: 98.2 °F (36.8 °C)   TempSrc: Oral   SpO2: 99%   Weight: 67.7 kg (149 lb 3.2 oz)   Height: 5' 4" (1.626 m)      Social History     Socioeconomic History    Marital status:    Tobacco Use    Smoking status: Never    Smokeless tobacco: Never   Substance and Sexual Activity    Alcohol use: Never    Drug use: Never    Sexual activity: Yes      No family history on file.       Objective:     Physical Exam  Vitals and nursing note reviewed. Exam conducted with a chaperone present.   Constitutional:       Appearance: Normal appearance. She is normal weight.   HENT:      Head: Normocephalic and atraumatic.      Nose: Nose normal.      Mouth/Throat:      Mouth: Mucous membranes are moist.      Pharynx: Oropharynx is clear.   Eyes:      Extraocular Movements: Extraocular movements intact.   Cardiovascular:      Rate and Rhythm: Normal rate and regular rhythm.      Pulses: Normal pulses.      Heart sounds: Normal heart sounds.   Pulmonary:      Effort: Pulmonary effort is normal. "      Breath sounds: Normal breath sounds.   Genitourinary:     General: Normal vulva.      Exam position: Lithotomy position.      Vagina: Normal.      Cervix: Normal.      Uterus: Normal.       Adnexa: Right adnexa normal and left adnexa normal.      Comments: Blood in vaginal vault  Musculoskeletal:         General: Normal range of motion.      Cervical back: Normal range of motion.   Skin:     General: Skin is warm and dry.   Neurological:      General: No focal deficit present.      Mental Status: She is alert and oriented to person, place, and time. Mental status is at baseline.   Psychiatric:         Mood and Affect: Mood normal.       Medications Ordered Prior to Encounter[1]  Health Maintenance   Topic Date Due    Colorectal Cancer Screening  Never done    Cervical Cancer Screening  08/31/2023    COVID-19 Vaccine (1 - 2024-25 season) Never done    Mammogram  02/26/2025    Hemoglobin A1c (Diabetic Prevention Screening)  04/23/2028    Lipid Panel  04/23/2030    TETANUS VACCINE  05/20/2032    RSV Vaccine (Age 60+ and Pregnant patients) (1 - 1-dose 75+ series) 04/06/2053    Hepatitis C Screening  Completed    HIV Screening  Completed    Influenza Vaccine  Addressed    Pneumococcal Vaccines (Age 0-49)  Aged Out      Results for orders placed or performed in visit on 04/23/25   Comprehensive Metabolic Panel    Collection Time: 04/23/25  8:30 AM   Result Value Ref Range    Sodium 141 136 - 145 mmol/L    Potassium 3.6 3.5 - 5.1 mmol/L    Chloride 110 (H) 98 - 107 mmol/L    CO2 24 22 - 29 mmol/L    Glucose 89 74 - 100 mg/dL    Blood Urea Nitrogen 8.6 7.0 - 18.7 mg/dL    Creatinine 0.83 0.55 - 1.02 mg/dL    Calcium 8.5 8.4 - 10.2 mg/dL    Protein Total 6.9 6.4 - 8.3 gm/dL    Albumin 3.7 3.5 - 5.0 g/dL    Globulin 3.2 2.4 - 3.5 gm/dL    Albumin/Globulin Ratio 1.2 1.1 - 2.0 ratio    Bilirubin Total 0.4 <=1.5 mg/dL    ALP 51 40 - 150 unit/L    ALT 12 0 - 55 unit/L    AST 16 11 - 45 unit/L    eGFR >60 mL/min/1.73/m2     Anion Gap 7.0 mEq/L    BUN/Creatinine Ratio 10    Hemoglobin A1C    Collection Time: 04/23/25  8:30 AM   Result Value Ref Range    Hemoglobin A1c 5.1 <=7.0 %    Estimated Average Glucose 99.7 mg/dL   Lipid Panel    Collection Time: 04/23/25  8:30 AM   Result Value Ref Range    Cholesterol Total 175 <=200 mg/dL    HDL Cholesterol 64 (H) 35 - 60 mg/dL    Triglyceride 84 37 - 140 mg/dL    Cholesterol/HDL Ratio 3 0 - 5    Very Low Density Lipoprotein 17     LDL Cholesterol 94.00 50.00 - 140.00 mg/dL   TSH    Collection Time: 04/23/25  8:30 AM   Result Value Ref Range    TSH 1.941 0.350 - 4.940 uIU/mL   Vitamin D    Collection Time: 04/23/25  8:30 AM   Result Value Ref Range    Vitamin D 66 30 - 80 ng/mL   Urinalysis, Reflex to Urine Culture    Collection Time: 04/23/25  8:30 AM    Specimen: Urine   Result Value Ref Range    Color, UA Yellow Yellow, Light-Yellow, Dark Yellow, [place], Straw    Appearance, UA Hazy (A) Clear    Specific Gravity, UA 1.020 1.005 - 1.030    pH, UA 6.0 5.0 - 8.5    Protein, UA Negative Negative    Glucose, UA Negative Negative, Normal    Ketones, UA Negative Negative    Blood, UA Large (A) Negative    Bilirubin, UA Negative Negative    Urobilinogen, UA 0.2 0.2, 1.0, Normal    Nitrites, UA Negative Negative    Leukocyte Esterase, UA Negative Negative   Luteinizing Hormone    Collection Time: 04/23/25  8:30 AM   Result Value Ref Range    Luteinizing Hormone 6.42 mIU/mL   Estradiol    Collection Time: 04/23/25  8:30 AM   Result Value Ref Range    Estradiol Level 56 pg/mL   CBC with Differential    Collection Time: 04/23/25  8:30 AM   Result Value Ref Range    WBC 5.81 4.50 - 11.50 x10(3)/mcL    RBC 4.24 4.20 - 5.40 x10(6)/mcL    Hgb 11.7 (L) 12.0 - 16.0 g/dL    Hct 34.9 (L) 37.0 - 47.0 %    MCV 82.3 80.0 - 94.0 fL    MCH 27.6 27.0 - 31.0 pg    MCHC 33.5 33.0 - 36.0 g/dL    RDW 14.1 11.5 - 17.0 %    Platelet 185 130 - 400 x10(3)/mcL    MPV 9.0 7.4 - 10.4 fL    Neut % 63.3 %    Lymph % 27.5 %    Mono  % 6.9 %    Eos % 1.5 %    Basophil % 0.5 %    Imm Grans % 0.3 %    Neut # 3.67 2.1 - 9.2 x10(3)/mcL    Lymph # 1.60 0.6 - 4.6 x10(3)/mcL    Mono # 0.40 0.1 - 1.3 x10(3)/mcL    Eos # 0.09 0 - 0.9 x10(3)/mcL    Baso # 0.03 <=0.2 x10(3)/mcL    Imm Gran # 0.02 0.00 - 0.04 x10(3)/mcL    NRBC% 0.0 %   Urinalysis, Microscopic    Collection Time: 04/23/25  8:30 AM   Result Value Ref Range    Bacteria, UA Rare None Seen, Rare, Occasional /HPF    RBC, UA 6-10 (A) None Seen, 0-2, 3-5, 0-5 /HPF    WBC, UA 0-2 None Seen, 0-2, 3-5, 0-5 /HPF    Squamous Epithelial Cells, UA Few (A) None Seen, Rare, Occasional, Occ /HPF          Assessment & Plan:     Active Problem List with Overview Notes    Diagnosis Date Noted    Pap smear for cervical cancer screening 04/24/2025    Fatigue 03/13/2023    Influenza vaccination declined by patient 11/02/2022    Tinea cruris 11/02/2022    Unstable angina 10/13/2022    Fibromyalgia 10/11/2022    Anxiety 09/13/2022    Acne 09/13/2022    Mitral valve prolapse 09/13/2022    Disorder of sacroiliac joint 09/13/2022    Breast cancer screening by mammogram 09/13/2022    Wellness examination 09/13/2022    Travel advice encounter 09/07/2022    Hypovitaminosis D     OCD (obsessive compulsive disorder)     Gastroesophageal reflux disease 02/23/2017                1. Wellness examination  Assessment & Plan:  Previously done labs reviewed with patient at time of appointment today  Mammogram 2/2024. ordered  Pap smear today with assistance from nurse  Cologuard ordered    Declines immunizations today        2. Hypovitaminosis D  Assessment & Plan:  Continue to supplement otc.         3. Gastroesophageal reflux disease, unspecified whether esophagitis present  Overview:        Assessment & Plan:  Stable on ppi and pepcid      4. Fibromyalgia  Assessment & Plan:  Stable on elavil.       5. Travel advice encounter  Assessment & Plan:  Prescription for zofran sent as requested.  Reminded patient to use sparingly and  only prn. Patient is agreeable to plan and verbalized understanding    Orders:  -     ondansetron (ZOFRAN-ODT) 4 MG TbDL; Take 2 tablets (8 mg total) by mouth 2 (two) times daily. As needed for nausea/vomiting  Dispense: 30 tablet; Refill: 0    6. Unstable angina  Assessment & Plan:  Has old rx bottle of ntg from dr. Deluca. .  Will send in refill as requested.       7. Disorder of sacroiliac joint  Assessment & Plan:  PT at [facility] as needed. Order placed as requested.     Orders:  -     Ambulatory referral/consult to Sports Medicine; Future; Expected date: 2025    8. Acne, unspecified acne type  Assessment & Plan:  Stable on current prescriptions. Keep appts with derm      9. Breast cancer screening by mammogram  Assessment & Plan:  mmg 2024. ordered.     Orders:  -     Mammo Digital Screening Bilat w/ Ibrahima (XPD); Future; Expected date: 2025    10. Pap smear for cervical cancer screening  Assessment & Plan:  Pap today with assistance from nurse. Will call with results when available.     Orders:  -     Liquid-Based Pap Smear, Screening    11. Colon cancer screening  -     Cologuard Screening (Multitarget Stool DNA); Future; Expected date: 2025    Other orders  -     nitroGLYCERIN (NITROSTAT) 0.4 MG SL tablet; As directed  Dispense: 25 tablet; Refill: 0         Follow up in about 1 year (around 2026) for Wellness with Labs.          [1]   Current Outpatient Medications on File Prior to Visit   Medication Sig Dispense Refill    amitriptyline (ELAVIL) 10 MG tablet Take 1 tablet (10 mg total) by mouth every evening. 90 tablet 0    azelaic acid (AZELEX) 15 % gel APPLY TOPICALLY 2 (TWO) TIMES A DAY. 50 g 3    clotrimazole-betamethasone 1-0.05% (LOTRISONE) cream Apply topically 2 (two) times daily. Apply to affected area on leg 45 g 1    DECARA 625 mcg (25,000 unit) Cap capsule TAKE 1 CAPSULE (25,000 UNITS TOTAL) BY MOUTH EVERY 7 DAYS. 12 capsule 3    dexlansoprazole (DEXILANT) 60 mg capsule  Take 1 capsule (60 mg total) by mouth once daily. 6 capsule 0    diazePAM (VALIUM) 2 MG tablet Take 1 tablet (2 mg total) by mouth every 6 (six) hours as needed for Anxiety. With flying 5 tablet 0    progesterone micronized (CRINONE) 4 % Gel Apply 0.5 mLs topically every evening. 1.125 g 3    PROGESTERONE MISC   Progesterone 4% Cre, See Instructions, apply 0.5ml to the skin EVERY NIGHT AT BEDTIME for 12 nights, # 6 mL, 11 Refill(s), Pharmacy: [facility], 162, cm, Height/Length Dosing, 10/15/21 8:07:00 CDT, 71, kg, Weight Dosing, 10/15/2...      UNABLE TO FIND apply 0.5ml to the skin EVERY night AT BEDTIME for 12 nights      zolpidem (AMBIEN) 5 MG Tab Take 1 tablet (5 mg total) by mouth nightly as needed (insomnia). 10 tablet 0    [DISCONTINUED] nitroGLYCERIN (NITROSTAT) 0.4 MG SL tablet PLEASE SEE ATTACHED FOR DETAILED DIRECTIONS      [DISCONTINUED] ondansetron (ZOFRAN-ODT) 4 MG TbDL Take 2 tablets (8 mg total) by mouth 2 (two) times daily. As needed for nausea/vomiting 30 tablet 0    famotidine (PEPCID) 20 MG tablet TAKE 2 TABLETS (40 MG TOTAL) BY MOUTH EVERY EVENING. 180 tablet 3    promethazine (PHENERGAN) 12.5 MG Tab Take 1 tablet (12.5 mg total) by mouth 4 (four) times daily. As needed for nausea/vomiting. May cause drowsiness. 30 tablet 0    [DISCONTINUED] hydrOXYzine pamoate (VISTARIL) 25 MG Cap Take 1 capsule (25 mg total) by mouth every 6 (six) hours as needed (anxiety). May cause drowsiness (Patient not taking: Reported on 4/24/2025) 40 capsule 3    [DISCONTINUED] minoxidil, bulk, Powd APPLY TO THE AFFECTED AREA(S) TOPICALLY EVERY EVENING (Patient not taking: Reported on 4/24/2025) 30 g 6    [DISCONTINUED] mupirocin (BACTROBAN) 2 % ointment Apply topically 3 (three) times daily. (Patient not taking: Reported on 4/24/2025)      [DISCONTINUED] UNABLE TO FIND Minoxidil5%/ Finasteride0.25%/ Tret0.01% Top Gel (Patient not taking: Reported on 4/24/2025)      [DISCONTINUED] UNABLE TO FIND  Minoxidil5%/ Finasteride0.25%/ Tret0.01% Top Gel (Patient not taking: Reported on 4/24/2025)      [DISCONTINUED] UNABLE TO FIND Progesterone 4% cream (laf) (Patient not taking: Reported on 4/24/2025)       No current facility-administered medications on file prior to visit.

## 2025-04-25 LAB — PAP RECOMMENDATION EXT: NORMAL

## 2025-04-28 ENCOUNTER — TELEPHONE (OUTPATIENT)
Dept: FAMILY MEDICINE | Facility: CLINIC | Age: 45
End: 2025-04-28
Payer: COMMERCIAL

## 2025-04-28 NOTE — TELEPHONE ENCOUNTER
----- Message from Obdulia sent at 4/28/2025  9:49 AM CDT -----  .Who Called: Latricia Monserrat is requesting information on test results.Name of Test (Lab/Mammo/Etc): labDate of Test: naWhere the test was performed: naOrdering Provider: PCPPreferred Method of Contact: Phone CallPatient's Preferred Phone Number on File: [phone] Best Call Back Number, if different:Additional Information: pt has questions and wants nurse to give her a call

## 2025-04-28 NOTE — TELEPHONE ENCOUNTER
She had asked for the dheas, lh and estradiol to be added for her derm. Please send to dr. Bolanos. They are wnl though    Pap is pending. Will call when resulted    Ua showed some blood.  Also skin cells so possible contaminant. I think she told me she was on cycle at time of collection.  If she is having uti symptoms, we can repeat    Vit d, tsh, lipid, A1c, cmp wnl.  Cbc showed mild anemia. Was on cycle I believe.  Monitor.

## 2025-04-28 NOTE — TELEPHONE ENCOUNTER
Patient called stating at he visit we were waiting on one last result to come in. She did not know the name of the test. I do see all labs are now complete. I told her I would ask to see what the result was

## 2025-04-30 ENCOUNTER — DOCUMENTATION ONLY (OUTPATIENT)
Dept: FAMILY MEDICINE | Facility: CLINIC | Age: 45
End: 2025-04-30
Payer: COMMERCIAL

## 2025-04-30 ENCOUNTER — RESULTS FOLLOW-UP (OUTPATIENT)
Dept: FAMILY MEDICINE | Facility: CLINIC | Age: 45
End: 2025-04-30

## 2025-04-30 ENCOUNTER — TELEPHONE (OUTPATIENT)
Dept: FAMILY MEDICINE | Facility: CLINIC | Age: 45
End: 2025-04-30
Payer: COMMERCIAL

## 2025-04-30 NOTE — TELEPHONE ENCOUNTER
Copied from CRM #[id number]. Topic: General Inquiry - Patient Advice  >> Apr 30, 2025  1:58 PM Clovis wrote:  Who Called: Latricia Collazo    Caller is requesting assistance/information from provider's office.    Symptoms (please be specific):    How long has patient had these symptoms:    List of preferred pharmacies on file (remove unneeded): [unfilled]  If different, enter pharmacy into here including location and phone number:       Preferred Method of Contact: Phone Call  Patient's Preferred Phone Number on File: [phone]   Best Call Back Number, if different:  Additional Information: pt called to follow up on PT order that should have been sent to [facility]. Pt asked referral is sent so appt can be sched at next avail

## 2025-05-08 DIAGNOSIS — L70.9 ACNE, UNSPECIFIED ACNE TYPE: ICD-10-CM

## 2025-05-08 RX ORDER — AZELAIC ACID 0.15 G/G
GEL TOPICAL 2 TIMES DAILY
Qty: 200 G | Refills: 0 | Status: SHIPPED | OUTPATIENT
Start: 2025-05-08

## 2025-07-21 ENCOUNTER — TELEPHONE (OUTPATIENT)
Dept: FAMILY MEDICINE | Facility: CLINIC | Age: 45
End: 2025-07-21
Payer: COMMERCIAL

## 2025-07-21 DIAGNOSIS — Z71.84 TRAVEL ADVICE ENCOUNTER: ICD-10-CM

## 2025-07-21 RX ORDER — ATOVAQUONE AND PROGUANIL HYDROCHLORIDE 250; 100 MG/1; MG/1
1 TABLET, FILM COATED ORAL DAILY
Qty: 21 TABLET | Refills: 0 | Status: SHIPPED | OUTPATIENT
Start: 2025-07-21 | End: 2025-08-11

## 2025-07-21 RX ORDER — PROMETHAZINE HYDROCHLORIDE 12.5 MG/1
12.5 TABLET ORAL 4 TIMES DAILY
Qty: 30 TABLET | Refills: 0 | Status: SHIPPED | OUTPATIENT
Start: 2025-07-21

## 2025-07-21 RX ORDER — ZOLPIDEM TARTRATE 5 MG/1
5 TABLET ORAL NIGHTLY PRN
Qty: 10 TABLET | Refills: 0 | Status: SHIPPED | OUTPATIENT
Start: 2025-07-21

## 2025-07-21 RX ORDER — ONDANSETRON 4 MG/1
8 TABLET, ORALLY DISINTEGRATING ORAL 2 TIMES DAILY
Qty: 30 TABLET | Refills: 0 | Status: SHIPPED | OUTPATIENT
Start: 2025-07-21

## 2025-07-21 RX ORDER — EPINEPHRINE 0.3 MG/.3ML
1 INJECTION SUBCUTANEOUS ONCE
Qty: 2 EACH | Refills: 0 | Status: SHIPPED | OUTPATIENT
Start: 2025-07-21 | End: 2025-07-21

## 2025-07-21 NOTE — TELEPHONE ENCOUNTER
I have signed for the following orders AND/OR meds.  Please call the patient and ask the patient to schedule the testing AND/OR inform about any medications that were sent.        Medications Ordered This Encounter   Medications    EPINEPHrine (EPIPEN 2-LAURO) 0.3 mg/0.3 mL AtIn     Sig: Inject 0.3 mLs (0.3 mg total) into the muscle once. for 1 dose     Dispense:  2 each     Refill:  0

## 2025-07-21 NOTE — TELEPHONE ENCOUNTER
I sent in malarone.  It says to begin 1-2 days before exposure and continue for 7 days after exposure.  I wasn't sure how long they were going for so I sent in 21 days worth but she does not need to take all of them.  Just for 7 days after they get home.        I have signed for the following orders AND/OR meds.  Please call the patient and ask the patient to schedule the testing AND/OR inform about any medications that were sent.        Medications Ordered This Encounter   Medications    atovaquone-proguaniL (MALARONE) 250-100 mg Tab     Sig: Take 1 tablet by mouth once daily. Start 1-2 days prior to exposure. Stop 7 days after exposure.  Give with food or milk. for 21 days     Dispense:  21 tablet     Refill:  0

## 2025-07-21 NOTE — TELEPHONE ENCOUNTER
Pt also requested refill of Epi pen and Malrone but it is not in pt's chart. Please advise. Thanks

## 2025-07-21 NOTE — TELEPHONE ENCOUNTER
Copied from CRM #[id number]. Topic: Medications - Medication Refill  >> Jul 21, 2025 10:18 AM Britt wrote:  Who Called: Latricia Collazo    Refill or New Rx:Refill  RX Name and Strength:zolpidem (AMBIEN) 5 MG Tab   How is the patient currently taking it? (ex. 1XDay):Sig - Route: Take 1 tablet (5 mg total) by mouth nightly as needed (insomnia). - Oral  Is this a 30 day or 90 day RX:10 tablet    RX Name and Strength:ondansetron (ZOFRAN-ODT) 4 MG TbDL   How is the patient currently taking it? (ex. 1XDay):Sig - Route: Take 2 tablets (8 mg total) by mouth 2 (two) times daily. As needed for nausea/vomiting - Oral  Is this a 30 day or 90 day RX:30 tablet    RX Name and Strength:promethazine (PHENERGAN) 12.5 MG Tab   How is the patient currently taking it? (ex. 1XDay):Sig - Route: Take 1 tablet (12.5 mg total) by mouth 4 (four) times daily. As needed for nausea/vomiting. May cause drowsiness. - Oral  Is this a 30 day or 90 day RX:30 tablet    RX Name and Strength:epinephrine auto injector .3MG  How is the patient currently taking it? (ex. 1XDay):as needed   Is this a 30 day or 90 day RX:30    RX Name and Strength:MALARONE 250MG  How is the patient currently taking it? (ex. 1XDay):1 xday as needed  Is this a 30 day or 90 day RX:30    Local or Mail Order:local   List of preferred pharmac[facility] on [facility] (remove unneeded): [facility]  If different Pharmacy is requested, enter Pharmacy information here including location and phone number: [id number] [address] [facility]   Phone: [phone]  Fax: [phone]         Ordering Provider:chacho       Preferred Method of Contact: Phone Call  Patient's Preferred Phone Number on File: [phone]   Best Call Back Number, if different:  Additional Information: refill request,  pt called and stated pharmacy advised that PA is needed to refill, please advise, thanks

## 2025-07-21 NOTE — TELEPHONE ENCOUNTER
Where are they travelling to and when are they leaving?      I have signed for the following orders AND/OR meds.  Please call the patient and ask the patient to schedule the testing AND/OR inform about any medications that were sent.        Medications Ordered This Encounter   Medications    ondansetron (ZOFRAN-ODT) 4 MG TbDL     Sig: Take 2 tablets (8 mg total) by mouth 2 (two) times daily. As needed for nausea/vomiting     Dispense:  30 tablet     Refill:  0    promethazine (PHENERGAN) 12.5 MG Tab     Sig: Take 1 tablet (12.5 mg total) by mouth 4 (four) times daily. As needed for nausea/vomiting. May cause drowsiness.     Dispense:  30 tablet     Refill:  0    zolpidem (AMBIEN) 5 MG Tab     Sig: Take 1 tablet (5 mg total) by mouth nightly as needed (insomnia).     Dispense:  10 tablet     Refill:  0